# Patient Record
Sex: FEMALE | Race: OTHER | Employment: UNEMPLOYED | URBAN - METROPOLITAN AREA
[De-identification: names, ages, dates, MRNs, and addresses within clinical notes are randomized per-mention and may not be internally consistent; named-entity substitution may affect disease eponyms.]

---

## 2024-03-11 ENCOUNTER — HOSPITAL ENCOUNTER (OUTPATIENT)
Age: 29
Discharge: HOME | End: 2024-03-11
Attending: STUDENT IN AN ORGANIZED HEALTH CARE EDUCATION/TRAINING PROGRAM | Admitting: STUDENT IN AN ORGANIZED HEALTH CARE EDUCATION/TRAINING PROGRAM

## 2024-03-11 ENCOUNTER — APPOINTMENT (OUTPATIENT)
Dept: ULTRASOUND IMAGING | Age: 29
End: 2024-03-11

## 2024-03-11 VITALS
RESPIRATION RATE: 16 BRPM | DIASTOLIC BLOOD PRESSURE: 57 MMHG | SYSTOLIC BLOOD PRESSURE: 118 MMHG | TEMPERATURE: 98.4 F | HEART RATE: 62 BPM | OXYGEN SATURATION: 99 %

## 2024-03-11 PROBLEM — O20.9 VAGINAL BLEEDING BEFORE 22 WEEKS GESTATION: Status: ACTIVE | Noted: 2024-03-11

## 2024-03-11 PROBLEM — O46.92 VAGINAL BLEEDING IN PREGNANCY, SECOND TRIMESTER: Status: ACTIVE | Noted: 2024-03-11

## 2024-03-11 LAB
AMPHETAMINES: NEGATIVE
BACTERIA: NEGATIVE /HPF
BARBITURATE SCREEN URINE: NEGATIVE
BENZODIAZEPINE SCREEN, URINE: NEGATIVE
BILIRUBIN URINE: NEGATIVE MG/DL
BLOOD, URINE: ABNORMAL
CANNABINOID SCREEN URINE: NEGATIVE
CLARITY: CLEAR
COCAINE METABOLITE: NEGATIVE
COLOR: YELLOW
FENTANYL URINE: NEGATIVE
GLUCOSE, URINE: NEGATIVE MG/DL
KETONES, URINE: NEGATIVE MG/DL
LEUKOCYTE ESTERASE, URINE: ABNORMAL
Lab: NORMAL
MUCUS: ABNORMAL HPF
NITRITE URINE, QUANTITATIVE: NEGATIVE
OPIATES, URINE: NEGATIVE
OXYCODONE: NEGATIVE
PH, URINE: 7 (ref 5–8)
PROTEIN UA: NEGATIVE MG/DL
RBC URINE: 0 /HPF (ref 0–6)
SPECIFIC GRAVITY UA: 1.01 (ref 1–1.03)
SPECIMEN: NORMAL
SQUAMOUS EPITHELIAL: 16 /HPF
TRANSITIONAL EPITHELIAL: <1 /HPF
TRICHOMONAS: ABNORMAL /HPF
UROBILINOGEN, URINE: 0.2 MG/DL (ref 0.2–1)
WBC UA: <1 /HPF (ref 0–5)
WET PREP: NORMAL
WET PREP: NORMAL

## 2024-03-11 PROCEDURE — 76805 OB US >/= 14 WKS SNGL FETUS: CPT

## 2024-03-11 PROCEDURE — 99213 OFFICE O/P EST LOW 20 MIN: CPT

## 2024-03-11 PROCEDURE — 80307 DRUG TEST PRSMV CHEM ANLYZR: CPT

## 2024-03-11 PROCEDURE — 87210 SMEAR WET MOUNT SALINE/INK: CPT

## 2024-03-11 PROCEDURE — 93975 VASCULAR STUDY: CPT

## 2024-03-11 PROCEDURE — 81001 URINALYSIS AUTO W/SCOPE: CPT

## 2024-03-11 PROCEDURE — 99213 OFFICE O/P EST LOW 20 MIN: CPT | Performed by: ADVANCED PRACTICE MIDWIFE

## 2024-03-11 RX ORDER — ACETAMINOPHEN 500 MG
1000 TABLET ORAL EVERY 8 HOURS PRN
Status: DISCONTINUED | OUTPATIENT
Start: 2024-03-11 | End: 2024-03-11 | Stop reason: HOSPADM

## 2024-03-11 ASSESSMENT — PAIN DESCRIPTION - ORIENTATION: ORIENTATION: LOWER

## 2024-03-11 ASSESSMENT — PAIN DESCRIPTION - DESCRIPTORS: DESCRIPTORS: CRAMPING

## 2024-03-11 ASSESSMENT — PAIN SCALES - GENERAL: PAINLEVEL_OUTOF10: 4

## 2024-03-11 ASSESSMENT — PAIN DESCRIPTION - LOCATION: LOCATION: ABDOMEN

## 2024-03-11 ASSESSMENT — PAIN - FUNCTIONAL ASSESSMENT: PAIN_FUNCTIONAL_ASSESSMENT: ACTIVITIES ARE NOT PREVENTED

## 2024-03-11 ASSESSMENT — PAIN DESCRIPTION - ONSET: ONSET: ON-GOING

## 2024-03-11 ASSESSMENT — PAIN DESCRIPTION - FREQUENCY: FREQUENCY: INTERMITTENT

## 2024-03-11 ASSESSMENT — PAIN DESCRIPTION - PAIN TYPE: TYPE: ACUTE PAIN

## 2024-03-11 NOTE — PROGRESS NOTES
Department of Obstetrics and Gynecology  Labor and Delivery  TRIAGE NOTE      SUBJECTIVE:    Chief Complaint   Patient presents with    Vaginal Bleeding     Francious 30 yo  at 16.1 wks presents for vaginal bleeding. Was seen in the ED on  for onset of bleeding. Patient reports bleeding has continued since then. Due to language barrier, difficult to assess level of bleeding. Reports bleeding was red and is now brown. Bleeding is \"continuous\" but only scant amount of brown bleeding is noticed on pad. Also reports a new vaginal odor. Denies cramping/contractions. Has felt some fetal movement.     OBJECTIVE    Vitals:  /63   Pulse 77   Temp 97.8 °F (36.6 °C) (Oral)   Resp 16   SpO2 99%       CONSTITUTIONAL:  negative  RESPIRATORY:  negative  CARDIOVASCULAR:  negative  GASTROINTESTINAL:  negative  ALLERGIC/IMMUNOLOGIC:  negative  NEUROLOGICAL:  negative  BEHAVIOR/PSYCH:  negative    SSE: Cervix visually closed. Scant old bleeding noted. Cervical polyps noted at 3 and 9 o'clock. Negative bleeding or pooling with valsalva.     Cervix:             Dilation:     Closed         Effacement:    Long         Consistency:    firm         Position:     posterior      Membranes:    Intact    Fetal heart rate:  145 by doppler       DATA:  Lab Results   Component Value Date    WBC 6.6 2024    HGB 10.0 (L) 2024    HCT 32.5 (L) 2024    MCV 73.4 (L) 2024     2024 11:55   WET PREP, GENITAL Rpt   Wet Prep NO TRICHOMONAS OR YEAST NOTED ON DIRECT WET PREP  NO  CLUE CELLS NOTED     Rpt: View report in Results Review for more information   Latest Reference Range & Units 24 11:40   Color, UA YELLOW  YELLOW   Clarity, UA CLEAR  CLEAR   Bilirubin, Urine NEGATIVE MG/DL NEGATIVE   Ketones, Urine NEGATIVE MG/DL NEGATIVE   Specific Gravity, UA 1.001 - 1.035  1.010   Blood, Urine NEGATIVE  LARGE NUMBER OR AMOUNT OBSERVED !   Protein, UA NEGATIVE MG/DL NEGATIVE

## 2024-03-11 NOTE — FLOWSHEET NOTE
Callie Thomas interpretor used for assessment. Patient ambulatory to the labor and delivery department with complaints of ongoing vaginal bleeding since her visit in the ED on 2/25/24. Despite using interpretor it is unclear if she has been seen at the [facility] since being seen in the ED. It is also unclear on how heavy the bleeding is, she states that the blood is \"always coming\" but also states \"the blood is only when I pee\". She does report that the color and odor of the blood has changed. Only a scant amount of blood is noted on the toilet paper/pad at this time and is brown in color. She has complaints of mild intermittent abdominal cramping.

## 2024-03-11 NOTE — FLOWSHEET NOTE
Discharge instructions given and explained. Instructed to follow up in office and return for increased vaginal bleeding, and instructed on pelvic rest. Pt verbalized understanding and pt left amb from unit for home without distress.

## 2024-03-11 NOTE — DISCHARGE INSTRUCTIONS
rechèch la brandyn w aprann plis francis \"Kontaj Kantite Fwa Tibebe Ou Bouje: Enstwiksyon brandyn Swen.\"  Ivana bird chetan: 10 July 2023               Laurarosalva arnoldjustin: 14.0  © [id number] [employer], [employer].   Adapte anba yon lisans Enstriksyon brandyn swen pwofesyonèl swen sante w la Si w gen kesyon francis yon maladi oswa enstriksyon sa a, toujou mande pwofesyonèl swen sante w la. [employer], [employer] voye jete tout garanti oswa responsablite brandyn itilize ou itilize enfòmasyon sa yo.

## 2024-03-29 LAB
ABO GROUPING: NORMAL
ANTIBODY SCREEN: NEGATIVE
BACTERIA, URINE: ABNORMAL /HPF
BASOPHILS ABSOLUTE: 0 K/UL (ref 0–0.3)
BASOPHILS RELATIVE PERCENT: 0.2 % (ref 0–2)
BILIRUBIN URINE: NEGATIVE MG/DL
BILIRUBIN URINE: NEGATIVE MG/DL
CHLAMYDIA, NUC. ACID AMP: NOT DETECTED
CLARITY: CLEAR
CLARITY: CLEAR
COLOR: YELLOW
COLOR: YELLOW
DIFFERENTIAL COUNT: NORMAL
EOSINOPHILS ABSOLUTE: 0.1 K/UL (ref 0–0.5)
EOSINOPHILS RELATIVE PERCENT: 1.6 % (ref 0–5)
EPITHELIAL CELLS, UA: ABNORMAL /HPF (ref 0–5)
EPITHELIAL CELLS, UA: ABNORMAL /HPF (ref 0–5)
ERYTHROCYTES URINE: ABNORMAL /HPF (ref 0–2)
GLUCOSE URINE: NEGATIVE MG/DL
GLUCOSE URINE: NEGATIVE MG/DL
HB: SOURCE: NORMAL
HCT VFR BLD CALC: 31.7 % (ref 34–49)
HEMOGLOBIN: 9.6 G/DL (ref 11.2–15.7)
HEPATITIS B SURF AG,XHBAGS: NEGATIVE
HEPATITIS C VIRUS RNA SER/PLAS NCNC: NEGATIVE
HIV 1+2 AB+HIV1P24 AG, EIA: NORMAL
IMMATURE GRANS (ABS): 0 K/UL (ref 0–0.1)
IMMATURE GRANULOCYTES: 0.5 %
KETONES, URINE: NEGATIVE MG/DL
KETONES, URINE: NEGATIVE MG/DL
LEUKOCYTE ESTERASE, URINE: ABNORMAL
LEUKOCYTE ESTERASE, URINE: ABNORMAL
LEUKOCYTES, UA: ABNORMAL /HPF (ref 0–5)
LYMPHOCYTES ABSOLUTE: 1.9 K/UL (ref 0.9–4.1)
LYMPHOCYTES RELATIVE PERCENT: 21.7 % (ref 14–51)
MCH RBC QN AUTO: 22.6 PG (ref 26–34)
MCHC RBC AUTO-ENTMCNC: 30.3 G/DL (ref 30.7–35.5)
MCV RBC AUTO: 74.8 FL (ref 80–100)
MONOCYTES ABSOLUTE: 0.6 K/UL (ref 0.2–1)
MONOCYTES RELATIVE PERCENT: 6.8 % (ref 4–12)
MUCUS: PRESENT
N GONORRHOEAE AMPLIFIED DET: NOT DETECTED
NEUTROPHILS ABSOLUTE: 6.1 K/UL (ref 1.8–7.5)
NITRITE, URINE: NEGATIVE
NITRITE, URINE: NEGATIVE
PDW BLD-RTO: 14.5 %
PH, URINE: 6.5 (ref 4.5–8)
PH, URINE: 6.5 (ref 4.5–8)
PLATELET # BLD: 273 K/UL (ref 140–400)
PMV BLD AUTO: 13 FL (ref 7.2–11.7)
PROTEIN, URINE: NEGATIVE MG/DL
PROTEIN, URINE: NEGATIVE MG/DL
RBC # BLD: 4.24 M/UL (ref 3.95–5.26)
RETICULOCYTE ABSOLUTE COUNT: 0 /100 WBC
RH FACTOR: POSITIVE
RPR: NORMAL
RUBELLA IGG QUANTITATION: 2.59 INDEX VALUE
SEGMENTED NEUTROPHILS RELATIVE PERCENT: 69.2 % (ref 42–80)
SPECIFIC GRAVITY UA: 1.02 (ref 1–1.03)
SPECIFIC GRAVITY UA: 1.02 (ref 1–1.03)
URINE HGB: NEGATIVE MG/DL
URINE HGB: NEGATIVE MG/DL
UROBILINOGEN, URINE: <2 MG/DL (ref 0–2)
UROBILINOGEN, URINE: <2 MG/DL (ref 0–2)
WBC # BLD: 8.8 K/UL (ref 3.5–10.9)

## 2024-05-20 ENCOUNTER — HOSPITAL ENCOUNTER (OUTPATIENT)
Dept: GENERAL RADIOLOGY | Age: 29
Discharge: HOME | End: 2024-05-20
Payer: COMMERCIAL

## 2024-05-20 ENCOUNTER — HOSPITAL ENCOUNTER (OUTPATIENT)
Age: 29
Discharge: HOME | End: 2024-05-20
Payer: COMMERCIAL

## 2024-05-20 DIAGNOSIS — Z11.1 SCREENING-PULMONARY TB: ICD-10-CM

## 2024-05-20 PROCEDURE — 71046 X-RAY EXAM CHEST 2 VIEWS: CPT

## 2024-05-31 ENCOUNTER — HOSPITAL ENCOUNTER (OUTPATIENT)
Age: 29
Discharge: HOME | End: 2024-05-31
Attending: STUDENT IN AN ORGANIZED HEALTH CARE EDUCATION/TRAINING PROGRAM | Admitting: STUDENT IN AN ORGANIZED HEALTH CARE EDUCATION/TRAINING PROGRAM
Payer: COMMERCIAL

## 2024-05-31 VITALS
SYSTOLIC BLOOD PRESSURE: 118 MMHG | OXYGEN SATURATION: 100 % | RESPIRATION RATE: 18 BRPM | BODY MASS INDEX: 22.76 KG/M2 | WEIGHT: 145 LBS | HEART RATE: 109 BPM | HEIGHT: 67 IN | TEMPERATURE: 96.4 F | DIASTOLIC BLOOD PRESSURE: 71 MMHG

## 2024-05-31 PROBLEM — O21.9 NAUSEA AND VOMITING DURING PREGNANCY: Status: ACTIVE | Noted: 2024-05-31

## 2024-05-31 LAB
BACTERIA: NEGATIVE /HPF
BILIRUBIN, URINE: NEGATIVE MG/DL
BLOOD, URINE: NEGATIVE
CLARITY: CLEAR
COLOR: YELLOW
GLUCOSE URINE: NEGATIVE MG/DL
KETONES, URINE: NEGATIVE MG/DL
LEUKOCYTE ESTERASE, URINE: ABNORMAL
MUCUS: ABNORMAL HPF
NITRITE URINE, QUANTITATIVE: NEGATIVE
PH, URINE: 7 (ref 5–8)
PROTEIN UA: NEGATIVE MG/DL
RBC URINE: 0 /HPF (ref 0–6)
SPECIFIC GRAVITY UA: 1.01 (ref 1–1.03)
SQUAMOUS EPITHELIAL: 5 /HPF
TRICHOMONAS: ABNORMAL /HPF
UROBILINOGEN, URINE: 0.2 MG/DL (ref 0.2–1)
WBC UA: <1 /HPF (ref 0–5)

## 2024-05-31 PROCEDURE — 99214 OFFICE O/P EST MOD 30 MIN: CPT | Performed by: ADVANCED PRACTICE MIDWIFE

## 2024-05-31 PROCEDURE — 81001 URINALYSIS AUTO W/SCOPE: CPT

## 2024-05-31 PROCEDURE — 99213 OFFICE O/P EST LOW 20 MIN: CPT

## 2024-05-31 PROCEDURE — 6370000000 HC RX 637 (ALT 250 FOR IP): Performed by: ADVANCED PRACTICE MIDWIFE

## 2024-05-31 PROCEDURE — 59025 FETAL NON-STRESS TEST: CPT | Performed by: ADVANCED PRACTICE MIDWIFE

## 2024-05-31 RX ORDER — ONDANSETRON 4 MG/1
4 TABLET, ORALLY DISINTEGRATING ORAL EVERY 8 HOURS PRN
Status: DISCONTINUED | OUTPATIENT
Start: 2024-05-31 | End: 2024-05-31 | Stop reason: HOSPADM

## 2024-05-31 RX ORDER — ONDANSETRON 2 MG/ML
4 INJECTION INTRAMUSCULAR; INTRAVENOUS EVERY 6 HOURS PRN
Status: DISCONTINUED | OUTPATIENT
Start: 2024-05-31 | End: 2024-05-31 | Stop reason: HOSPADM

## 2024-05-31 RX ORDER — ONDANSETRON 4 MG/1
4 TABLET, ORALLY DISINTEGRATING ORAL 3 TIMES DAILY PRN
Qty: 21 TABLET | Refills: 1 | Status: SHIPPED | OUTPATIENT
Start: 2024-05-31

## 2024-05-31 RX ADMIN — ONDANSETRON 4 MG: 4 TABLET, ORALLY DISINTEGRATING ORAL at 15:01

## 2024-05-31 NOTE — FLOWSHEET NOTE
Discharge instructions given and explained. Instructed to follow up in the office as scheduled. And return for decreased FM, leaking fluid, vaginal bleeding, or contractions. Information given on fetal kick counts. PT left amb form unit for home without distress.

## 2024-05-31 NOTE — DISCHARGE INSTRUCTIONS
Counting Your Baby's Kicks: Care Instructions  Overview     Kontaj Kantite Fwa Tibebe Ou Bouje: Enstwiksyon brandyn Swen  Counting Your Baby's Kicks: Care Instructions  Enstriksyon brandyn Swen Ou  Kontaj kantite fwa tibebe ou bouje se yon fason doktè ou kapab di si tibebe ou an sante. Pifò fanm yo-sitou nan yon premye gwosès-marley tibebe ou ap bouje brandyn premye fwa ant 16 ak 22 semèn. Yo ka marley mouvman an tankou batman zèl alaplas koutpye. Tibebe ou ka bouje plis nan sèten moman lajounen an. Lè ou aktif, ou ka remake tibebe ou bouje mwens pase lè ou repoze. Nan vizit anvan akouchman ou, doktè ou ap mande ou si tibebe a aktif.  Nan dènye trimès ou, doktè ou ka mande ou brandyn konte kantite fwa ou marley tibebe ou bouje.  Swen siplemantè se yon alfonso enpòtan nan tretman ak sekirite ou. Sonje brandyn pran tout randevou yo epi brandyn david azeem yo, epitou rele doktè ou si ou gen pwoblèm. Se yon bon lide nirav brandyn konnen rezilta tès ou yo, epitou brandyn konsève yon lis medikaman w ap pran yo.  Kijan brandyn konte kantite fwa tibebe a bouje nan vant ou?  Yon metòd komen brandyn tcheke mouvman tibebe ou se konte kantite koutpye ou marley li bay oswa kantite fwa ou marley li bouje nan 1 èdtan. Li nòmal brandyn marley 10 mouvman (tankou kout-waleska, mouvman martha a k ap vòltije, oswa mouvman martha a k ap woule) nan 1 èdtan. Kèk doktè sijere brandyn konte lematen jouk lè ou rive nan 10 mouvman. Answit ou kapab kite brandyn kevin a epi rekòmanse nan kevin apre a.  Chwazi moman tibebe ou pi aktif nan jounen an brandyn konte. Sa ka nenpòt moman ant lematen ak aswè.  Si ou pa marley 10 mouvman nan inèdtan, tibebe ou ak ap dòmi. Rete tann pwochen èdtan an, epi konte ankò.  Kilè ou ta dwe rele brandyn mande èd?  Rele doktè ou kounye a oswa chèche swen medikal imedyatman si:  Ou te remake tibebe ou te sispann bouje oswa l ap bouje mwens pase nòmal.  Siveye deprè brandyn chanjman nan sante ou, epitou asire ou kontakte doktè ou si ou gen nenpòt pwoblèm.  Ki kote ou kapab aprann plis?  David francis

## 2024-05-31 NOTE — PROGRESS NOTES
Department of Obstetrics and Gynecology  Labor and Delivery  TRIAGE NOTE      SUBJECTIVE:    Chief Complaint   Patient presents with    Abdominal Pain     Edrine is a 30 yo  at 27.5 weeks. She presents to triage with concern for abdominal pain around her umbilicus. She also reports nausea and vomiting since yesterday morning. She reports she did have some nausea earlier in pregnancy but has not had any recently up until yesterday. She denies ctxs, vb, lof, urinary sx.    OBJECTIVE    Vitals:  /71   Pulse (!) 109   Temp (!) 96.4 °F (35.8 °C) (Oral)   Resp 18   Ht 1.702 m (5' 7\")   Wt 65.8 kg (145 lb)   SpO2 100%   BMI 22.71 kg/m²       CONSTITUTIONAL:  negative  RESPIRATORY:  negative  CARDIOVASCULAR:  negative  GASTROINTESTINAL:  negative  ALLERGIC/IMMUNOLOGIC:  negative  NEUROLOGICAL:  negative  BEHAVIOR/PSYCH:  negative    Cervix:  deferred    Fetal Position:    Unsure    Membranes:    Intact    Fetal heart rate:        Baseline FHR :    150 bpm              Fetal Accelerations:  present        Fetal Decelerations:  absent        Fetal Variability:   moderate        NST Time: approx 30 min    Contraction frequency:  None     DATA:  UA pending       ASSESSMENT:       30 yo  at 27.5 weeks   N/v  Abdominal pain  Cat I FHR tracing; reactive NST     PLAN:    Will give zofran for nausea. Sx consistent with GI illness.     Will plan to discharge home if UA WNL and sx improved with zofran.     I have collaborated and updated Dr Roy  and the MD agrees with the current POC.     Carolin Lam, KYLEIGH - EDEL

## 2024-05-31 NOTE — PROGRESS NOTES
Patient arrived ambulatory to Labor & Delivery for triage for umbilical pain that started yesterday.     Patient shown to room and instructed to place on gown and obtain urine specimen. Patient oriented to room and call light.    EFM & toco applied, +FHR. Abdomen soft and non tender to palpation. Patient denies headache, epigastric pain, abdominal pain and contractions. Patient reports feeling her baby move well. Denies vaginal bleeding or leaking of fluid.      Midwife notified of arrival.

## 2024-06-13 ENCOUNTER — APPOINTMENT (OUTPATIENT)
Dept: ULTRASOUND IMAGING | Age: 29
End: 2024-06-13
Payer: COMMERCIAL

## 2024-06-13 ENCOUNTER — HOSPITAL ENCOUNTER (OUTPATIENT)
Age: 29
Discharge: HOME | End: 2024-06-13
Attending: OBSTETRICS & GYNECOLOGY | Admitting: OBSTETRICS & GYNECOLOGY
Payer: COMMERCIAL

## 2024-06-13 VITALS
SYSTOLIC BLOOD PRESSURE: 101 MMHG | BODY MASS INDEX: 24.96 KG/M2 | OXYGEN SATURATION: 98 % | WEIGHT: 159 LBS | HEIGHT: 67 IN | TEMPERATURE: 98.5 F | DIASTOLIC BLOOD PRESSURE: 55 MMHG | HEART RATE: 76 BPM | RESPIRATION RATE: 17 BRPM

## 2024-06-13 LAB
BACTERIA: NEGATIVE /HPF
BILIRUBIN, URINE: NEGATIVE MG/DL
BLOOD, URINE: ABNORMAL
CLARITY: CLEAR
COLOR: YELLOW
GLUCOSE URINE: NEGATIVE MG/DL
KETONES, URINE: NEGATIVE MG/DL
LEUKOCYTE ESTERASE, URINE: NEGATIVE
NITRITE URINE, QUANTITATIVE: NEGATIVE
PH, URINE: 6 (ref 5–8)
PROTEIN UA: NEGATIVE MG/DL
RBC URINE: <1 /HPF (ref 0–6)
SPECIFIC GRAVITY UA: <1.005 (ref 1–1.03)
SQUAMOUS EPITHELIAL: 1 /HPF
TRICHOMONAS: NORMAL /HPF
UROBILINOGEN, URINE: 0.2 MG/DL (ref 0.2–1)
WBC UA: 1 /HPF (ref 0–5)

## 2024-06-13 PROCEDURE — 76815 OB US LIMITED FETUS(S): CPT

## 2024-06-13 PROCEDURE — 81001 URINALYSIS AUTO W/SCOPE: CPT

## 2024-06-13 PROCEDURE — 59025 FETAL NON-STRESS TEST: CPT | Performed by: ADVANCED PRACTICE MIDWIFE

## 2024-06-13 PROCEDURE — 84112 EVAL AMNIOTIC FLUID PROTEIN: CPT

## 2024-06-13 PROCEDURE — 99213 OFFICE O/P EST LOW 20 MIN: CPT

## 2024-06-13 PROCEDURE — 99213 OFFICE O/P EST LOW 20 MIN: CPT | Performed by: ADVANCED PRACTICE MIDWIFE

## 2024-06-13 RX ORDER — ACETAMINOPHEN 500 MG
1000 TABLET ORAL EVERY 8 HOURS PRN
Status: DISCONTINUED | OUTPATIENT
Start: 2024-06-13 | End: 2024-06-13 | Stop reason: HOSPADM

## 2024-06-13 NOTE — FLOWSHEET NOTE
Patient ambulatory to the labor and delivery department. She states that she was sent by her doctor for an ultrasound. She denies any bleeding but states that she began leaking fluid yesterday at around 2pm. Verbalizes normal fetal movement. EFM and TOCO on and tracing, call light within reach.    Callie Thomas interpretor Afshin #[id number] via bedside video Ipad

## 2024-06-13 NOTE — PROGRESS NOTES
ED 11.7. Pt can be discharged home.     Third trimester warning signs reviewed and pt to     KYLEIGH Anderson CNM

## 2024-06-13 NOTE — PROGRESS NOTES
Department of Obstetrics and Gynecology  Labor and Delivery  TRIAGE NOTE      SUBJECTIVE:    Chief Complaint   Patient presents with    Pregnancy Problem     Sent by OB for an ultrasound     Edrine is a 30 yo  at 29.4 weeks. She presents to triage from the [facility] with concern for LOF. She reports noticing a small amount of leaking fluid yesterday. She denies vaginal bleeding, regular ctxs. She denies urinary sx.     OBJECTIVE    Vitals:  /62   Pulse 82   Temp 98 °F (36.7 °C) (Oral)   Resp 18   Ht 1.702 m (5' 7.01\")   Wt 72.1 kg (159 lb)   SpO2 100%   BMI 24.90 kg/m²       CONSTITUTIONAL:  negative  RESPIRATORY:  negative  CARDIOVASCULAR:  negative  GASTROINTESTINAL:  negative  ALLERGIC/IMMUNOLOGIC:  negative  NEUROLOGICAL:  negative  BEHAVIOR/PSYCH:  negative    SSE: neg for lof/bleeding/leaking with valsalva. Small cervical polyps noted at 2 and 8 o'clock. Cervix visually long/closed    Fetal Position:    Unsure     Membranes:    Amnisure neg, neg for pooling, u/s pending     Fetal heart rate:        Baseline FHR :    140 bpm              Fetal Accelerations:  present        Fetal Decelerations:  absent        Fetal Variability:   moderate        NST Time: approx 30 min    Contraction frequency:  None     DATA:  Amnisure neg    U/s pending     ASSESSMENT:       30 yo  at 29.4 weeks   Concern for LOF   Cat I FHR tracing; reactive NST     PLAN:    Will discharge home if ED WNL.       KYLEIGH Anderson CNM

## 2024-06-13 NOTE — FLOWSHEET NOTE
This nurse at bedside with Carolin HOLLINGSWORTH. Amnisure test completed and pending. Speculum exam performed by Carolin HOLLINGSWORTH with patient consent. Patient tolerated exam well.

## 2024-08-08 ENCOUNTER — HOSPITAL ENCOUNTER (OUTPATIENT)
Age: 29
Discharge: HOME | End: 2024-08-08
Attending: OBSTETRICS & GYNECOLOGY | Admitting: OBSTETRICS & GYNECOLOGY
Payer: COMMERCIAL

## 2024-08-08 VITALS
RESPIRATION RATE: 16 BRPM | DIASTOLIC BLOOD PRESSURE: 55 MMHG | SYSTOLIC BLOOD PRESSURE: 103 MMHG | OXYGEN SATURATION: 98 % | TEMPERATURE: 98.4 F | HEART RATE: 83 BPM

## 2024-08-08 LAB
BACTERIA: ABNORMAL /HPF
BILIRUBIN, URINE: NEGATIVE MG/DL
BLOOD, URINE: NEGATIVE
CLARITY, UA: CLEAR
COLOR, UA: YELLOW
GLUCOSE URINE: NEGATIVE MG/DL
KETONES, URINE: NEGATIVE MG/DL
LEUKOCYTE ESTERASE, URINE: ABNORMAL
NITRITE URINE, QUANTITATIVE: NEGATIVE
PH, URINE: 7 (ref 5–8)
PROTEIN UA: NEGATIVE MG/DL
RBC URINE: 1 /HPF (ref 0–6)
SPECIFIC GRAVITY UA: 1.01 (ref 1–1.03)
SQUAMOUS EPITHELIAL: 11 /HPF
TRICHOMONAS: ABNORMAL /HPF
UROBILINOGEN, URINE: 0.2 MG/DL (ref 0.2–1)
WBC UA: <1 /HPF (ref 0–5)

## 2024-08-08 PROCEDURE — 99213 OFFICE O/P EST LOW 20 MIN: CPT

## 2024-08-08 PROCEDURE — 59025 FETAL NON-STRESS TEST: CPT | Performed by: ADVANCED PRACTICE MIDWIFE

## 2024-08-08 PROCEDURE — 99213 OFFICE O/P EST LOW 20 MIN: CPT | Performed by: ADVANCED PRACTICE MIDWIFE

## 2024-08-08 PROCEDURE — 87086 URINE CULTURE/COLONY COUNT: CPT

## 2024-08-08 PROCEDURE — 81001 URINALYSIS AUTO W/SCOPE: CPT

## 2024-08-08 NOTE — FLOWSHEET NOTE
EFM off discharge instructions given and explained. Instructed to follow up in office as scheduled. Return for decreased FM, leaking fluid, or vaginal bleeding. Pt verbalized understanding and left amb from unit for home without distress.

## 2024-08-08 NOTE — FLOWSHEET NOTE
Pt presents amb to unit for difficulty urinating and \"lump\" on left side of vagina. States feels like needs to urinate but can't. Denies vaginal bleeding or leaking fluid. Abd soft and non tender. FM audible. POC discussed. Carolin HOLLINGSWORTH notified of pt arrival. Interpretor services used.

## 2024-08-08 NOTE — PROGRESS NOTES
Department of Obstetrics and Gynecology  Labor and Delivery  TRIAGE NOTE      SUBJECTIVE:  No chief complaint on file.    Jordy is a 30 yo  at 37.4 weeks. She presents to triage with concern for a bump on her vagina as well as difficulty urinating. She reports she feels like she has to urinate but has difficulty initiating a urine stream. She denies hematuria, dysuria. She denies ctxs, lof, vb.     OBJECTIVE    Vitals:  /65   Pulse (!) 120   Temp 98.4 °F (36.9 °C) (Temporal)   Resp 18   SpO2 98%       CONSTITUTIONAL:  negative  RESPIRATORY:  negative  CARDIOVASCULAR:  negative  GASTROINTESTINAL:  negative  : Small 5emo7ss ingrown hair noted to L labia; no s/s of infection, non-purulent.   ALLERGIC/IMMUNOLOGIC:  negative  NEUROLOGICAL:  negative  BEHAVIOR/PSYCH:  negative    Cervix:  deferred    Fetal Position:    Cephalic    Membranes:    Intact    Fetal heart rate:        Baseline FHR :    130 bpm              Fetal Accelerations:  present        Fetal Decelerations:  absent        Fetal Variability:   moderate        NST Time: approx 60 min    Contraction frequency:  Irregular, rare    DATA:   Latest Reference Range & Units 24 13:10   Glucose, Ur NEGATIVE MG/DL NEGATIVE   Bilirubin, Urine NEGATIVE MG/DL NEGATIVE   Ketones, Urine NEGATIVE MG/DL NEGATIVE   Specific Gravity, UA 1.001 - 1.035  1.010   Blood, Urine NEGATIVE  NEGATIVE   Protein, UA NEGATIVE MG/DL NEGATIVE   Urobilinogen 0.2 - 1.0 MG/DL 0.2   Leukocyte Esterase, Urine NEGATIVE  TRACE !   Nitrite Urine, Quantitative NEGATIVE  NEGATIVE   pH, Urine 5.0 - 8.0  7.0   WBC, UA 0 - 5 /HPF <1   RBC, UA 0 - 6 /HPF 1   Squam Epithel, UA /HPF 11   Bacteria, UA NEGATIVE /HPF OCCASIONAL !   Trichomonas NONE SEEN /HPF NONE SEEN   !: Data is abnormal    ASSESSMENT:       30 yo  at 37.4 weeks  Difficulty initiating urine stream  UA w/o s/s of UTI   Folliculitis    PLAN:    Will add on urine cultute to r/o UTI even though UA w/o s/s of  UTI.     Encouraged epsom salt soak and warm compresses for folliculitis.     Will discharge home with third trimester precautions.     KYLEIGH Anderson CNM

## 2024-08-08 NOTE — DISCHARGE INSTRUCTIONS
Kontaj Kantite Fwa Tibebe Ou Bouje: Enstwiksyon brandyn Swen  Counting Your Baby's Kicks: Care Instructions  Enstriksyon brandyn Swen Ou  Kontaj kantite fwa tibebe ou bouje se yon fason doktè ou kapab di si tibebe ou an sante. Pifò fanm yo-sitou nan yon premye gwosès-marley tibebe ou ap bouje brandyn premye fwa ant 16 ak 22 semèn. Yo ka marley mouvman an tankou batman zèl alaplas koutpye. Tibebe ou ka bouje plis nan sèten moman lajounen an. Lè ou aktif, ou ka remake tibebe ou bouje mwens pase lè ou repoze. Nan vizit anvan akouchman ou, doktè ou ap mande ou si tibebe a aktif.  Nan dènye trimès ou, doktè ou ka mande ou brandyn konte kantite fwa ou marley tibebe ou bouje.  Swen siplemantè se yon alfonso enpòtan nan tretman ak sekirite ou. Sonje brandyn pran tout randevou yo epi brandyn david azeem yo, epitou rele doktè ou si ou gen pwoblèm. Se yon bon lide nirav brandyn konnen rezilta tès ou yo, epitou brandyn konsève yon lis medikaman w ap pran yo.  Kijan brandyn konte kantite fwa tibebe a bouje nan vant ou?  Yon metòd komen brandyn tcheke mouvman tibebe ou se konte kantite koutpye ou marley li bay oswa kantite fwa ou marley li bouje nan 1 èdtan. Li nòmal brandyn marley 10 mouvman (tankou kout-waleska, mouvman martha a k ap vòltije, oswa mouvman martha a k ap woule) nan 1 èdtan. Kèk doktè sijere brandyn konte lematen jouk lè ou rive nan 10 mouvman. Answit ou kapab kite brandyn kevin a epi rekòmanse nan kevin apre a.  Chwazi moman tibebe ou pi aktif nan jounen an brandyn konte. Sa ka nenpòt moman ant lematen ak aswè.  Si ou pa marley 10 mouvman nan inèdtan, tibebe ou ak ap dòmi. Rete tann pwochen èdtan an, epi konte ankò.  Kilè ou ta dwe rele brandyn mande èd?  Rele doktè ou kounye a oswa chèche swen medikal imedyatman si:  Ou te remake tibebe ou te sispann bouje oswa l ap bouje mwens pase nòmal.  Siveye deprè brandyn chanjman nan sante ou, epitou asire ou kontakte doktè ou si ou gen nenpòt pwoblèm.  Ki kote ou kapab aprann plis?  David francis   [email or web address]  Antre [id number] nan bwat

## 2024-08-09 LAB
CULTURE: NORMAL
Lab: NORMAL
SPECIMEN: NORMAL

## 2024-08-21 ENCOUNTER — HOSPITAL ENCOUNTER (OUTPATIENT)
Age: 29
Discharge: HOME | End: 2024-08-21
Attending: OBSTETRICS & GYNECOLOGY | Admitting: OBSTETRICS & GYNECOLOGY
Payer: COMMERCIAL

## 2024-08-21 VITALS
HEIGHT: 67 IN | SYSTOLIC BLOOD PRESSURE: 106 MMHG | RESPIRATION RATE: 18 BRPM | TEMPERATURE: 98 F | BODY MASS INDEX: 25.11 KG/M2 | HEART RATE: 82 BPM | OXYGEN SATURATION: 100 % | WEIGHT: 160 LBS | DIASTOLIC BLOOD PRESSURE: 66 MMHG

## 2024-08-21 LAB
BACTERIA: ABNORMAL /HPF
BILIRUBIN, URINE: NEGATIVE MG/DL
BLOOD, URINE: NEGATIVE
CLARITY, UA: CLEAR
COLOR, UA: YELLOW
GLUCOSE URINE: NEGATIVE MG/DL
KETONES, URINE: NEGATIVE MG/DL
LEUKOCYTE ESTERASE, URINE: ABNORMAL
MUCUS: ABNORMAL HPF
NITRITE URINE, QUANTITATIVE: NEGATIVE
PH, URINE: 6 (ref 5–8)
PROTEIN UA: NEGATIVE MG/DL
RBC URINE: 2 /HPF (ref 0–6)
SPECIFIC GRAVITY UA: 1.01 (ref 1–1.03)
SQUAMOUS EPITHELIAL: 16 /HPF
TRICHOMONAS: ABNORMAL /HPF
UROBILINOGEN, URINE: 0.2 MG/DL (ref 0.2–1)
WBC UA: 3 /HPF (ref 0–5)
YEAST: ABNORMAL /HPF

## 2024-08-21 PROCEDURE — 81001 URINALYSIS AUTO W/SCOPE: CPT

## 2024-08-21 PROCEDURE — 99213 OFFICE O/P EST LOW 20 MIN: CPT

## 2024-08-21 PROCEDURE — 80307 DRUG TEST PRSMV CHEM ANLYZR: CPT

## 2024-08-22 NOTE — FLOWSHEET NOTE
2013- EFM and [place] placed on patient; she reports feeling vaginal pressure like she needs to have BM, with occasional ctx's; denies any vaginal bleeding or leaking of fluid; reports positive fetal movement, abdomen soft and non-tender upon palpation.     2015- Vital signs taken; SVE with 1cm/thick.

## 2024-08-22 NOTE — FLOWSHEET NOTE
Patient arrived ambulatory to BC with c/o vaginal pressure; taken to LT-05. Interpretor    # [id number] used for Fijian Creole language.

## 2024-08-22 NOTE — DISCHARGE INSTRUCTIONS
LABOR    Call your doctor if you have these signs:     Regular tightening of the uterus coming as often as once every 15 minutes     Menstrual-like cramps, they may be regular, or may be continuous and move to   your back.     A low, dull backache different from what you normally experience. It may come and go.    Vaginal leaking of fluid.     Any bleeding from your vagina.    Pain, burning or bleeding when you urinate.      LABOR    Call your doctor, or come to the hospital, if you have:    Contractions coming about every 3-5 minutes apart, for about one hour. Labor contractions are usually strong enough that you can not walk or talk while having the contractions. ( Contractions can feel like menstrual cramps, tightening in your abdomen, rectal pressure or a backache. This feeling comes and goes.)    Vaginal leaking of fluid.    Heavy, bright red bleeding, like the days of your period. ( A little bloody show or spotting is normal in labor.)    ALWAYS CALL YOUR DOCTOR IF YOU:    Notice decreased movement of your baby, different from what you are used to.    Have heavy, bright red bleeding, like the heavy days of your periods.    Have vaginal leaking of fluid.    Keep your next appointment in the birthing center as needed.     Activity as tolerated    Diet as tolerated

## 2024-08-22 NOTE — FLOWSHEET NOTE
Discharge paperwork given and explained to patient via interpretor #[id number]; all questions answered; to always return with any increased pain and ctx's, vaginal bleeding or leaking, or decreased fetal movement; she verbalized understanding; patient ambulated out of BC upon discharge, no distress noted.

## 2024-08-22 NOTE — FLOWSHEET NOTE
TR to Dr. Donahue with patient arrival, MILLIE, OB hx, EFM and [place] tracings reviewed, 1cm/thick; U/A sent, orders received to re-check cervix in couple hours and if no change made, ok to discharge.

## 2024-08-26 ENCOUNTER — HOSPITAL ENCOUNTER (INPATIENT)
Age: 29
LOS: 3 days | Discharge: HOME | End: 2024-08-29
Attending: OBSTETRICS & GYNECOLOGY | Admitting: STUDENT IN AN ORGANIZED HEALTH CARE EDUCATION/TRAINING PROGRAM
Payer: COMMERCIAL

## 2024-08-26 ENCOUNTER — ANESTHESIA (OUTPATIENT)
Dept: LABOR AND DELIVERY | Age: 29
End: 2024-08-26
Payer: COMMERCIAL

## 2024-08-26 ENCOUNTER — ANESTHESIA EVENT (OUTPATIENT)
Dept: LABOR AND DELIVERY | Age: 29
End: 2024-08-26
Payer: COMMERCIAL

## 2024-08-26 PROBLEM — Z3A.40 40 WEEKS GESTATION OF PREGNANCY: Status: ACTIVE | Noted: 2024-08-26

## 2024-08-26 PROBLEM — O36.63X0: Status: ACTIVE | Noted: 2024-08-26

## 2024-08-26 PROBLEM — Z34.90 ENCOUNTER FOR ELECTIVE INDUCTION OF LABOR: Status: ACTIVE | Noted: 2024-08-26

## 2024-08-26 PROBLEM — D25.9 UTERINE LEIOMYOMA: Status: ACTIVE | Noted: 2024-08-26

## 2024-08-26 PROBLEM — O09.30 LATE PRENATAL CARE: Status: ACTIVE | Noted: 2024-08-26

## 2024-08-26 LAB
ABO/RH: NORMAL
ANTIBODY SCREEN: NEGATIVE
HCT VFR BLD CALC: 29.5 % (ref 37–47)
HEMOGLOBIN: 9.2 GM/DL (ref 12.5–16)
MCH RBC QN AUTO: 22.7 PG (ref 27–31)
MCHC RBC AUTO-ENTMCNC: 31.2 % (ref 32–36)
MCV RBC AUTO: 72.7 FL (ref 78–100)
PDW BLD-RTO: 14.8 % (ref 11.7–14.9)
PLATELET # BLD: 183 K/CU MM (ref 140–440)
RBC # BLD: 4.06 M/CU MM (ref 4.2–5.4)
T. PALLIDUM SCREEN: NEGATIVE
WBC # BLD: 5.3 K/CU MM (ref 4–10.5)

## 2024-08-26 PROCEDURE — 3700000001 HC ADD 15 MINUTES (ANESTHESIA): Performed by: STUDENT IN AN ORGANIZED HEALTH CARE EDUCATION/TRAINING PROGRAM

## 2024-08-26 PROCEDURE — 85027 COMPLETE CBC AUTOMATED: CPT

## 2024-08-26 PROCEDURE — 6360000002 HC RX W HCPCS: Performed by: ADVANCED PRACTICE MIDWIFE

## 2024-08-26 PROCEDURE — 6360000002 HC RX W HCPCS: Performed by: NURSE ANESTHETIST, CERTIFIED REGISTERED

## 2024-08-26 PROCEDURE — 59514 CESAREAN DELIVERY ONLY: CPT | Performed by: ADVANCED PRACTICE MIDWIFE

## 2024-08-26 PROCEDURE — 6370000000 HC RX 637 (ALT 250 FOR IP): Performed by: ADVANCED PRACTICE MIDWIFE

## 2024-08-26 PROCEDURE — 88342 IMHCHEM/IMCYTCHM 1ST ANTB: CPT | Performed by: PATHOLOGY

## 2024-08-26 PROCEDURE — 2709999900 HC NON-CHARGEABLE SUPPLY: Performed by: STUDENT IN AN ORGANIZED HEALTH CARE EDUCATION/TRAINING PROGRAM

## 2024-08-26 PROCEDURE — APPNB30 APP NON BILLABLE TIME 0-30 MINS: Performed by: ADVANCED PRACTICE MIDWIFE

## 2024-08-26 PROCEDURE — 3609079900 HC CESAREAN SECTION: Performed by: STUDENT IN AN ORGANIZED HEALTH CARE EDUCATION/TRAINING PROGRAM

## 2024-08-26 PROCEDURE — 88307 TISSUE EXAM BY PATHOLOGIST: CPT | Performed by: PATHOLOGY

## 2024-08-26 PROCEDURE — 86780 TREPONEMA PALLIDUM: CPT

## 2024-08-26 PROCEDURE — 7100000001 HC PACU RECOVERY - ADDTL 15 MIN: Performed by: STUDENT IN AN ORGANIZED HEALTH CARE EDUCATION/TRAINING PROGRAM

## 2024-08-26 PROCEDURE — 1220000000 HC SEMI PRIVATE OB R&B

## 2024-08-26 PROCEDURE — 86900 BLOOD TYPING SEROLOGIC ABO: CPT

## 2024-08-26 PROCEDURE — 59514 CESAREAN DELIVERY ONLY: CPT | Performed by: STUDENT IN AN ORGANIZED HEALTH CARE EDUCATION/TRAINING PROGRAM

## 2024-08-26 PROCEDURE — 7100000000 HC PACU RECOVERY - FIRST 15 MIN: Performed by: STUDENT IN AN ORGANIZED HEALTH CARE EDUCATION/TRAINING PROGRAM

## 2024-08-26 PROCEDURE — 6360000002 HC RX W HCPCS: Performed by: ANESTHESIOLOGY

## 2024-08-26 PROCEDURE — 86850 RBC ANTIBODY SCREEN: CPT

## 2024-08-26 PROCEDURE — 6360000002 HC RX W HCPCS: Performed by: STUDENT IN AN ORGANIZED HEALTH CARE EDUCATION/TRAINING PROGRAM

## 2024-08-26 PROCEDURE — 3700000000 HC ANESTHESIA ATTENDED CARE: Performed by: STUDENT IN AN ORGANIZED HEALTH CARE EDUCATION/TRAINING PROGRAM

## 2024-08-26 PROCEDURE — 2580000003 HC RX 258: Performed by: ADVANCED PRACTICE MIDWIFE

## 2024-08-26 PROCEDURE — 2500000003 HC RX 250 WO HCPCS: Performed by: ADVANCED PRACTICE MIDWIFE

## 2024-08-26 PROCEDURE — 86901 BLOOD TYPING SEROLOGIC RH(D): CPT

## 2024-08-26 RX ORDER — CITRIC ACID/SODIUM CITRATE 334-500MG
30 SOLUTION, ORAL ORAL ONCE
Status: COMPLETED | OUTPATIENT
Start: 2024-08-26 | End: 2024-08-26

## 2024-08-26 RX ORDER — TERBUTALINE SULFATE 1 MG/ML
0.25 INJECTION, SOLUTION SUBCUTANEOUS
Status: DISCONTINUED | OUTPATIENT
Start: 2024-08-26 | End: 2024-08-26

## 2024-08-26 RX ORDER — MORPHINE SULFATE 0.5 MG/ML
INJECTION, SOLUTION EPIDURAL; INTRATHECAL; INTRAVENOUS PRN
Status: DISCONTINUED | OUTPATIENT
Start: 2024-08-26 | End: 2024-08-26 | Stop reason: SDUPTHER

## 2024-08-26 RX ORDER — TRANEXAMIC ACID 10 MG/ML
1000 INJECTION, SOLUTION INTRAVENOUS
Status: DISCONTINUED | OUTPATIENT
Start: 2024-08-26 | End: 2024-08-26

## 2024-08-26 RX ORDER — SODIUM CHLORIDE, SODIUM LACTATE, POTASSIUM CHLORIDE, CALCIUM CHLORIDE 600; 310; 30; 20 MG/100ML; MG/100ML; MG/100ML; MG/100ML
INJECTION, SOLUTION INTRAVENOUS CONTINUOUS
Status: DISCONTINUED | OUTPATIENT
Start: 2024-08-26 | End: 2024-08-26

## 2024-08-26 RX ORDER — SODIUM CHLORIDE 9 MG/ML
25 INJECTION, SOLUTION INTRAVENOUS PRN
Status: DISCONTINUED | OUTPATIENT
Start: 2024-08-26 | End: 2024-08-26

## 2024-08-26 RX ORDER — IBUPROFEN 800 MG/1
800 TABLET, FILM COATED ORAL EVERY 8 HOURS
Status: DISCONTINUED | OUTPATIENT
Start: 2024-08-27 | End: 2024-08-29 | Stop reason: HOSPADM

## 2024-08-26 RX ORDER — SODIUM CHLORIDE 9 MG/ML
INJECTION, SOLUTION INTRAVENOUS PRN
Status: DISCONTINUED | OUTPATIENT
Start: 2024-08-26 | End: 2024-08-29 | Stop reason: HOSPADM

## 2024-08-26 RX ORDER — DEXAMETHASONE SODIUM PHOSPHATE 4 MG/ML
INJECTION, SOLUTION INTRA-ARTICULAR; INTRALESIONAL; INTRAMUSCULAR; INTRAVENOUS; SOFT TISSUE PRN
Status: DISCONTINUED | OUTPATIENT
Start: 2024-08-26 | End: 2024-08-26 | Stop reason: SDUPTHER

## 2024-08-26 RX ORDER — SODIUM CHLORIDE 0.9 % (FLUSH) 0.9 %
5-40 SYRINGE (ML) INJECTION PRN
Status: DISCONTINUED | OUTPATIENT
Start: 2024-08-26 | End: 2024-08-29 | Stop reason: HOSPADM

## 2024-08-26 RX ORDER — KETOROLAC TROMETHAMINE 30 MG/ML
INJECTION, SOLUTION INTRAMUSCULAR; INTRAVENOUS PRN
Status: DISCONTINUED | OUTPATIENT
Start: 2024-08-26 | End: 2024-08-26 | Stop reason: SDUPTHER

## 2024-08-26 RX ORDER — SODIUM CHLORIDE, SODIUM LACTATE, POTASSIUM CHLORIDE, AND CALCIUM CHLORIDE .6; .31; .03; .02 G/100ML; G/100ML; G/100ML; G/100ML
1000 INJECTION, SOLUTION INTRAVENOUS PRN
Status: DISCONTINUED | OUTPATIENT
Start: 2024-08-26 | End: 2024-08-26

## 2024-08-26 RX ORDER — SODIUM CHLORIDE 0.9 % (FLUSH) 0.9 %
5-40 SYRINGE (ML) INJECTION EVERY 12 HOURS SCHEDULED
Status: DISCONTINUED | OUTPATIENT
Start: 2024-08-26 | End: 2024-08-29 | Stop reason: HOSPADM

## 2024-08-26 RX ORDER — ACETAMINOPHEN 325 MG/1
975 TABLET ORAL ONCE
Status: COMPLETED | OUTPATIENT
Start: 2024-08-26 | End: 2024-08-26

## 2024-08-26 RX ORDER — FAMOTIDINE 20 MG/1
20 TABLET, FILM COATED ORAL 2 TIMES DAILY
Status: DISCONTINUED | OUTPATIENT
Start: 2024-08-26 | End: 2024-08-29 | Stop reason: HOSPADM

## 2024-08-26 RX ORDER — SODIUM CHLORIDE 0.9 % (FLUSH) 0.9 %
5-40 SYRINGE (ML) INJECTION EVERY 12 HOURS SCHEDULED
Status: DISCONTINUED | OUTPATIENT
Start: 2024-08-26 | End: 2024-08-26

## 2024-08-26 RX ORDER — OXYCODONE HYDROCHLORIDE 5 MG/1
10 TABLET ORAL EVERY 4 HOURS PRN
Status: DISCONTINUED | OUTPATIENT
Start: 2024-08-26 | End: 2024-08-29 | Stop reason: HOSPADM

## 2024-08-26 RX ORDER — DIPHENHYDRAMINE HYDROCHLORIDE 50 MG/ML
25 INJECTION INTRAMUSCULAR; INTRAVENOUS EVERY 6 HOURS PRN
Status: DISCONTINUED | OUTPATIENT
Start: 2024-08-26 | End: 2024-08-29 | Stop reason: HOSPADM

## 2024-08-26 RX ORDER — SODIUM CHLORIDE, SODIUM LACTATE, POTASSIUM CHLORIDE, AND CALCIUM CHLORIDE .6; .31; .03; .02 G/100ML; G/100ML; G/100ML; G/100ML
500 INJECTION, SOLUTION INTRAVENOUS PRN
Status: DISCONTINUED | OUTPATIENT
Start: 2024-08-26 | End: 2024-08-26

## 2024-08-26 RX ORDER — DIPHENHYDRAMINE HCL 25 MG
25 TABLET ORAL EVERY 6 HOURS PRN
Status: DISCONTINUED | OUTPATIENT
Start: 2024-08-26 | End: 2024-08-29 | Stop reason: HOSPADM

## 2024-08-26 RX ORDER — ACETAMINOPHEN 500 MG
1000 TABLET ORAL EVERY 8 HOURS SCHEDULED
Status: DISCONTINUED | OUTPATIENT
Start: 2024-08-26 | End: 2024-08-29 | Stop reason: HOSPADM

## 2024-08-26 RX ORDER — GLYCOPYRROLATE 0.2 MG/ML
INJECTION INTRAMUSCULAR; INTRAVENOUS PRN
Status: DISCONTINUED | OUTPATIENT
Start: 2024-08-26 | End: 2024-08-26 | Stop reason: SDUPTHER

## 2024-08-26 RX ORDER — CARBOPROST TROMETHAMINE 250 UG/ML
250 INJECTION, SOLUTION INTRAMUSCULAR PRN
Status: DISCONTINUED | OUTPATIENT
Start: 2024-08-26 | End: 2024-08-26

## 2024-08-26 RX ORDER — ONDANSETRON 2 MG/ML
4 INJECTION INTRAMUSCULAR; INTRAVENOUS EVERY 6 HOURS PRN
Status: DISCONTINUED | OUTPATIENT
Start: 2024-08-26 | End: 2024-08-29 | Stop reason: HOSPADM

## 2024-08-26 RX ORDER — FAMOTIDINE 10 MG/ML
20 INJECTION, SOLUTION INTRAVENOUS 2 TIMES DAILY PRN
Status: DISCONTINUED | OUTPATIENT
Start: 2024-08-26 | End: 2024-08-26 | Stop reason: HOSPADM

## 2024-08-26 RX ORDER — MISOPROSTOL 200 UG/1
400 TABLET ORAL PRN
Status: DISCONTINUED | OUTPATIENT
Start: 2024-08-26 | End: 2024-08-26

## 2024-08-26 RX ORDER — DOCUSATE SODIUM 100 MG/1
100 CAPSULE, LIQUID FILLED ORAL 2 TIMES DAILY
Status: DISCONTINUED | OUTPATIENT
Start: 2024-08-26 | End: 2024-08-29 | Stop reason: HOSPADM

## 2024-08-26 RX ORDER — BUPIVACAINE HYDROCHLORIDE 7.5 MG/ML
INJECTION, SOLUTION INTRASPINAL
Status: COMPLETED | OUTPATIENT
Start: 2024-08-26 | End: 2024-08-26

## 2024-08-26 RX ORDER — ONDANSETRON 2 MG/ML
4 INJECTION INTRAMUSCULAR; INTRAVENOUS EVERY 6 HOURS PRN
Status: DISCONTINUED | OUTPATIENT
Start: 2024-08-26 | End: 2024-08-26

## 2024-08-26 RX ORDER — FENTANYL CITRATE 50 UG/ML
100 INJECTION, SOLUTION INTRAMUSCULAR; INTRAVENOUS
Status: DISCONTINUED | OUTPATIENT
Start: 2024-08-26 | End: 2024-08-26 | Stop reason: HOSPADM

## 2024-08-26 RX ORDER — LANOLIN 72 %
OINTMENT (GRAM) TOPICAL
Status: DISCONTINUED | OUTPATIENT
Start: 2024-08-26 | End: 2024-08-29 | Stop reason: HOSPADM

## 2024-08-26 RX ORDER — SODIUM CHLORIDE, SODIUM LACTATE, POTASSIUM CHLORIDE, AND CALCIUM CHLORIDE .6; .31; .03; .02 G/100ML; G/100ML; G/100ML; G/100ML
1000 INJECTION, SOLUTION INTRAVENOUS ONCE
Status: DISCONTINUED | OUTPATIENT
Start: 2024-08-26 | End: 2024-08-26

## 2024-08-26 RX ORDER — ONDANSETRON 4 MG/1
4 TABLET, ORALLY DISINTEGRATING ORAL EVERY 8 HOURS PRN
Status: DISCONTINUED | OUTPATIENT
Start: 2024-08-26 | End: 2024-08-29 | Stop reason: HOSPADM

## 2024-08-26 RX ORDER — FAMOTIDINE 10 MG/ML
20 INJECTION, SOLUTION INTRAVENOUS ONCE
Status: COMPLETED | OUTPATIENT
Start: 2024-08-26 | End: 2024-08-26

## 2024-08-26 RX ORDER — OXYCODONE HYDROCHLORIDE 5 MG/1
5 TABLET ORAL EVERY 4 HOURS PRN
Status: DISCONTINUED | OUTPATIENT
Start: 2024-08-26 | End: 2024-08-29 | Stop reason: HOSPADM

## 2024-08-26 RX ORDER — LIDOCAINE HYDROCHLORIDE 10 MG/ML
30 INJECTION, SOLUTION EPIDURAL; INFILTRATION; INTRACAUDAL; PERINEURAL PRN
Status: DISCONTINUED | OUTPATIENT
Start: 2024-08-26 | End: 2024-08-26

## 2024-08-26 RX ORDER — KETOROLAC TROMETHAMINE 30 MG/ML
30 INJECTION, SOLUTION INTRAMUSCULAR; INTRAVENOUS EVERY 6 HOURS
Status: DISPENSED | OUTPATIENT
Start: 2024-08-26 | End: 2024-08-27

## 2024-08-26 RX ORDER — SODIUM CHLORIDE 0.9 % (FLUSH) 0.9 %
5-40 SYRINGE (ML) INJECTION PRN
Status: DISCONTINUED | OUTPATIENT
Start: 2024-08-26 | End: 2024-08-26

## 2024-08-26 RX ORDER — SODIUM CHLORIDE 0.9 % (FLUSH) 0.9 %
10 SYRINGE (ML) INJECTION EVERY 12 HOURS SCHEDULED
Status: DISCONTINUED | OUTPATIENT
Start: 2024-08-26 | End: 2024-08-26

## 2024-08-26 RX ORDER — PHENYLEPHRINE HCL IN 0.9% NACL 1 MG/10 ML
SYRINGE (ML) INTRAVENOUS PRN
Status: DISCONTINUED | OUTPATIENT
Start: 2024-08-26 | End: 2024-08-26 | Stop reason: SDUPTHER

## 2024-08-26 RX ORDER — ONDANSETRON 4 MG/1
4 TABLET, ORALLY DISINTEGRATING ORAL EVERY 6 HOURS PRN
Status: DISCONTINUED | OUTPATIENT
Start: 2024-08-26 | End: 2024-08-26

## 2024-08-26 RX ORDER — SODIUM CHLORIDE 0.9 % (FLUSH) 0.9 %
10 SYRINGE (ML) INJECTION PRN
Status: DISCONTINUED | OUTPATIENT
Start: 2024-08-26 | End: 2024-08-26

## 2024-08-26 RX ORDER — FENTANYL CITRATE 50 UG/ML
INJECTION, SOLUTION INTRAMUSCULAR; INTRAVENOUS PRN
Status: DISCONTINUED | OUTPATIENT
Start: 2024-08-26 | End: 2024-08-26 | Stop reason: SDUPTHER

## 2024-08-26 RX ORDER — SODIUM CHLORIDE 9 MG/ML
INJECTION, SOLUTION INTRAVENOUS PRN
Status: DISCONTINUED | OUTPATIENT
Start: 2024-08-26 | End: 2024-08-26

## 2024-08-26 RX ORDER — METHYLERGONOVINE MALEATE 0.2 MG/ML
200 INJECTION INTRAVENOUS PRN
Status: DISCONTINUED | OUTPATIENT
Start: 2024-08-26 | End: 2024-08-26

## 2024-08-26 RX ORDER — ONDANSETRON 2 MG/ML
4 INJECTION INTRAMUSCULAR; INTRAVENOUS EVERY 6 HOURS PRN
Status: DISCONTINUED | OUTPATIENT
Start: 2024-08-26 | End: 2024-08-26 | Stop reason: HOSPADM

## 2024-08-26 RX ADMIN — BUPIVACAINE HYDROCHLORIDE IN DEXTROSE 12 MG: 7.5 INJECTION, SOLUTION SUBARACHNOID at 16:33

## 2024-08-26 RX ADMIN — WATER 2000 MG: 1 INJECTION INTRAMUSCULAR; INTRAVENOUS; SUBCUTANEOUS at 16:15

## 2024-08-26 RX ADMIN — ACETAMINOPHEN 975 MG: 325 TABLET ORAL at 15:32

## 2024-08-26 RX ADMIN — Medication 100 MCG: at 16:35

## 2024-08-26 RX ADMIN — GLYCOPYRROLATE 0.2 MG: 0.2 INJECTION, SOLUTION INTRAMUSCULAR; INTRAVENOUS at 16:38

## 2024-08-26 RX ADMIN — FENTANYL CITRATE 15 MCG: 50 INJECTION, SOLUTION INTRAMUSCULAR; INTRAVENOUS at 16:30

## 2024-08-26 RX ADMIN — KETOROLAC TROMETHAMINE 30 MG: 30 INJECTION, SOLUTION INTRAMUSCULAR; INTRAVENOUS at 17:18

## 2024-08-26 RX ADMIN — FAMOTIDINE 20 MG: 10 INJECTION, SOLUTION INTRAVENOUS at 16:11

## 2024-08-26 RX ADMIN — DEXAMETHASONE SODIUM PHOSPHATE 8 MG: 4 INJECTION, SOLUTION INTRAMUSCULAR; INTRAVENOUS at 16:59

## 2024-08-26 RX ADMIN — SODIUM CHLORIDE, POTASSIUM CHLORIDE, SODIUM LACTATE AND CALCIUM CHLORIDE: 600; 310; 30; 20 INJECTION, SOLUTION INTRAVENOUS at 13:11

## 2024-08-26 RX ADMIN — ONDANSETRON 4 MG: 2 INJECTION INTRAMUSCULAR; INTRAVENOUS at 16:09

## 2024-08-26 RX ADMIN — MORPHINE SULFATE 0.2 MG: 0.5 INJECTION, SOLUTION EPIDURAL; INTRATHECAL; INTRAVENOUS at 16:30

## 2024-08-26 RX ADMIN — SODIUM CHLORIDE, POTASSIUM CHLORIDE, SODIUM LACTATE AND CALCIUM CHLORIDE: 600; 310; 30; 20 INJECTION, SOLUTION INTRAVENOUS at 17:53

## 2024-08-26 RX ADMIN — Medication 909 ML/HR: at 16:58

## 2024-08-26 RX ADMIN — SODIUM CITRATE AND CITRIC ACID MONOHYDRATE 30 ML: 500; 334 SOLUTION ORAL at 16:11

## 2024-08-26 RX ADMIN — ONDANSETRON 4 MG: 2 INJECTION INTRAMUSCULAR; INTRAVENOUS at 21:44

## 2024-08-26 ASSESSMENT — PAIN SCALES - GENERAL
PAINLEVEL_OUTOF10: 0
PAINLEVEL_OUTOF10: 2
PAINLEVEL_OUTOF10: 4

## 2024-08-26 ASSESSMENT — PAIN DESCRIPTION - FREQUENCY
FREQUENCY: INTERMITTENT
FREQUENCY: INTERMITTENT

## 2024-08-26 ASSESSMENT — PAIN DESCRIPTION - LOCATION
LOCATION: ABDOMEN
LOCATION: ABDOMEN

## 2024-08-26 ASSESSMENT — PAIN - FUNCTIONAL ASSESSMENT
PAIN_FUNCTIONAL_ASSESSMENT: ACTIVITIES ARE NOT PREVENTED
PAIN_FUNCTIONAL_ASSESSMENT: ACTIVITIES ARE NOT PREVENTED

## 2024-08-26 ASSESSMENT — PAIN DESCRIPTION - PAIN TYPE
TYPE: ACUTE PAIN
TYPE: ACUTE PAIN

## 2024-08-26 ASSESSMENT — PAIN DESCRIPTION - DESCRIPTORS
DESCRIPTORS: CRAMPING
DESCRIPTORS: CRAMPING

## 2024-08-26 ASSESSMENT — PAIN DESCRIPTION - RADICULAR PAIN: RADICULAR_PAIN: ABSENT

## 2024-08-26 ASSESSMENT — PAIN DESCRIPTION - ONSET: ONSET: GRADUAL

## 2024-08-26 ASSESSMENT — PAIN DESCRIPTION - ORIENTATION
ORIENTATION: LOWER
ORIENTATION: LOWER;MID

## 2024-08-26 NOTE — CONSULTS
Session ID: [id number]  Language: Bermudian Creole   ID: #[id number]   Name: Romi    Original interpreting session disconnected. Reconnected to ask pt about PPH symptoms.

## 2024-08-26 NOTE — ANESTHESIA PROCEDURE NOTES
Spinal Block    Patient location during procedure: OB  Reason for block: primary anesthetic  Staffing  Performed: resident/CRNA   Anesthesiologist: James Tello MD  Resident/CRNA: Araceli Madrigal APRN - CRNA  Performed by: Araceli Madrigal APRN - CRNA  Authorized by: James Tello MD    Spinal Block  Patient position: sitting  Prep: Betadine  Patient monitoring: cardiac monitor, frequent blood pressure checks and continuous pulse ox  Approach: midline  Location: L3/L4  Provider prep: mask and sterile gloves  Local infiltration: lidocaine  Needle  Needle type: Pencan   Needle gauge: 25 G  Needle length: 3.5 in  Assessment  Sensory level: T4  Swirl obtained: Yes  CSF: clear  Attempts: 1  Hemodynamics: stable  Preanesthetic Checklist  Completed: patient identified, IV checked, site marked, risks and benefits discussed, surgical/procedural consents, equipment checked, pre-op evaluation, timeout performed, anesthesia consent given, oxygen available, monitors applied/VS acknowledged, fire risk safety assessment completed and verbalized and blood product R/B/A discussed and consented

## 2024-08-26 NOTE — H&P
Department of Obstetrics and Gynecology   Obstetrics History and Physical        CHIEF COMPLAINT:   Chief Complaint   Patient presents with    Contractions         HISTORY OF PRESENT ILLNESS:      The patient is a 29 y.o.   at 40w1d.  OB History          4    Para   2    Term   2            AB   1    Living   2         SAB   1    IAB        Ectopic        Molar        Multiple        Live Births   2            Patient presents with a chief complaint as above and is being admitted for induction    Estimated Due Date: Estimated Date of Delivery: 24    PRENATAL CARE:    Complicated by: postdates pregnancy, suspected macrosomia, anemia, ASCUS, late prenatal care     PAST OB HISTORY  OB History          4    Para   2    Term   2            AB   1    Living   2         SAB   1    IAB        Ectopic        Molar        Multiple        Live Births   2                Past Medical History:    History reviewed. No pertinent past medical history.  Past Surgical History:        Procedure Laterality Date    DILATION AND CURETTAGE OF UTERUS  2021     Allergies:  Patient has no known allergies.  Social History:    Social History     Socioeconomic History    Marital status: Single     Spouse name: Not on file    Number of children: Not on file    Years of education: Not on file    Highest education level: Not on file   Occupational History    Not on file   Tobacco Use    Smoking status: Never    Smokeless tobacco: Never   Vaping Use    Vaping status: Never Used   Substance and Sexual Activity    Alcohol use: Not Currently    Drug use: Never    Sexual activity: Not Currently   Other Topics Concern    Not on file   Social History Narrative    Not on file     Social Determinants of Health     Financial Resource Strain: Not on file   Food Insecurity: Not on file   Transportation Needs: Not on file   Physical Activity: Not on file   Stress: Not on file   Social Connections: Not on file  CNM

## 2024-08-26 NOTE — ANESTHESIA PRE PROCEDURE
Department of Anesthesiology  Preprocedure Note       Name:  Jordy Kim   Age:  29 y.o.  :  1995                                          MRN:  [id number]         Date:  2024      Surgeon: * No surgeons listed *    Procedure: * No procedures listed *    Medications prior to admission:   Prior to Admission medications    Medication Sig Start Date End Date Taking? Authorizing Provider   Prenatal Vit-Fe Fumarate-FA (PRENATAL VITAMINS) 28-0.8 MG TABS Take 1 tablet by mouth daily 24  Yes Ilana Onofre DO   acetaminophen (TYLENOL) 500 MG tablet Take 1 tablet by mouth 4 times daily as needed for Pain 24  Yes Ilana Onofre DO       Current medications:    Current Facility-Administered Medications   Medication Dose Route Frequency Provider Last Rate Last Admin   • lactated ringers IV soln infusion   IntraVENous Continuous Carolin Lam APRN -  mL/hr at 24 1311 New Bag at 24 1311   • lactated ringers bolus 500 mL  500 mL IntraVENous PRN Carolin Lam APRN - CNM        Or   • lactated ringers bolus 1,000 mL  1,000 mL IntraVENous PRN Carolin Lam APRN - CNM       • sodium chloride flush 0.9 % injection 5-40 mL  5-40 mL IntraVENous 2 times per day Carolin Lam APRN - CNM       • sodium chloride flush 0.9 % injection 5-40 mL  5-40 mL IntraVENous PRN Carolin Lam APRN - CNM       • 0.9 % sodium chloride infusion  25 mL IntraVENous PRN Carolin Lam APRN - CNM       • oxytocin (PITOCIN) 30 units in 500 mL infusion  1-20 scarlett-units/min IntraVENous Continuous Carolin Lam APRN - CNM       • methylergonovine (METHERGINE) injection 200 mcg  200 mcg IntraMUSCular PRN Carolin Lam APRN - CNM       • carboprost (HEMABATE) injection 250 mcg  250 mcg IntraMUSCular PRN Carolin Lam APRN - CNM       • miSOPROStol (CYTOTEC) tablet 400 mcg  400 mcg Buccal PRN Carolin Lam APRN - CNM       • tranexamic acid-NaCl IVPB premix 1,000 mg

## 2024-08-26 NOTE — PROGRESS NOTES
Patient 29 year old  at 40w1d who was admitted for induction of labor. Patient is a RH patient and on 8/15 had US with EFW 99.3%, 9lb 5oz. She was counseled in the office about risk of shoulder dystocia and offered  section for suspected fetal macrosomia which she adamantly declined. Upon admission today, CNM again discussed risks of shoulder dystocia, brachial plexus injury, clavicular fracture due to suspected fetal macrosomia. Initially patient desired to try again for a  as she has had two previous SVDs, but did report the baby got 'stuck' during one of these. After further counseling patient desires to proceed with PLTCS due to suspected fetal macrosomia.    Patient was counseled on the risks, benefits, alternatives and indications of  delivery. Risks include, but are not limited to infection, bleeding possibly requiring transfusion (with associated risks of HIV, hepatitis, and other blood-borne illnesses) or hysterectomy, injury to surrounding organs, vasculature, and nerves, injury to fetus, post-operative thromboembolic events (including DVT and PE), risk of anesthesia, and risk of death.  Risk for future pregnancy and delivery concerns including risk of trial of labor after  section and repeat  section reviewed.  Patient verbalized understanding of these risks. All questions were answered to her satisfaction. Pt desires to proceed with  delivery.    Electronically signed by: Libra Roy DO 2024

## 2024-08-26 NOTE — PROGRESS NOTES
CNM had been advised that pt refused c/s mutliple times in the office and wanted to attempt vaginal delivery. CNM to room to clarify and ensure pt comfortable with the risks associated with  of macrosomic infant.    Discussed risks of shoulder dystocia including shoulder dystocia, brachial plexus injury, clavicular fracture and hypoxic injury given EFW of 4225g on 8/15. Advised pt that current EFW if likely close to 5kg. We discussed option of primary c/s to reduce these risks. In triage, pt had initially reported no issues with her first two vaginal deliveries but did state she needed episiotomies. In this conversation, pt reports she had a very difficult delivery with her son and he was stuck. I tried to clarify if he was stuck after his head was delivered and she reports, \"his head started coming out but then got stuck and then went back in\". I was unable to gather any more clarity regarding if this was a previous shoulder dystocia. After further counseling, pt reports she would actually prefer to proceed with primary c/s. Pt last had a sandwich at 11am. Will discuss with Dr. Roy.     KYLEIGH Anderson CNM

## 2024-08-26 NOTE — FLOWSHEET NOTE
Presents ambulatory to the BC c/o light vaginal bleeding.  Oriented to LT05 and instructed to obtain ccua and change into a gown.  Denies leaking of fluid. 1201 EFM and toco applied  +FM abdomen soft non tender.  Patient states was at the office today and after exam had some spotting.  States has been having mild cramping since last night.  BLANK Lam CNM notified of patients arrival and complaints.

## 2024-08-26 NOTE — ANESTHESIA PRE PROCEDURE
Department of Anesthesiology  Preprocedure Note       Name:  Jordy Kim   Age:  29 y.o.  :  1995                                          MRN:  [id number]         Date:  2024      Surgeon: Surgeon(s):  Libra Roy, Carolin Jara APRN - CNM    Procedure: Procedure(s):   SECTION    Medications prior to admission:   Prior to Admission medications    Medication Sig Start Date End Date Taking? Authorizing Provider   Prenatal Vit-Fe Fumarate-FA (PRENATAL VITAMINS) 28-0.8 MG TABS Take 1 tablet by mouth daily 24  Yes Ilana Onofre DO   acetaminophen (TYLENOL) 500 MG tablet Take 1 tablet by mouth 4 times daily as needed for Pain 24  Yes Ilana Onofre DO       Current medications:    Current Facility-Administered Medications   Medication Dose Route Frequency Provider Last Rate Last Admin   • lactated ringers IV soln infusion   IntraVENous Continuous Carolin Lam APRN -  mL/hr at 24 1311 New Bag at 24 1311   • lactated ringers bolus 500 mL  500 mL IntraVENous PRN Carolin Lam APRN - CNM        Or   • lactated ringers bolus 1,000 mL  1,000 mL IntraVENous PRN Carolin Lam APRN - CNM       • sodium chloride flush 0.9 % injection 5-40 mL  5-40 mL IntraVENous 2 times per day Carolin Lam APRN - CNM       • sodium chloride flush 0.9 % injection 5-40 mL  5-40 mL IntraVENous PRN Carolin Lam APRN - CNM       • 0.9 % sodium chloride infusion  25 mL IntraVENous PRN Carolin Lam APRN - CNM       • oxytocin (PITOCIN) 30 units in 500 mL infusion  1-20 scarlett-units/min IntraVENous Continuous Carolin Lam APRN - CNM       • methylergonovine (METHERGINE) injection 200 mcg  200 mcg IntraMUSCular PRN Carolin Lam APRN - CNM       • carboprost (HEMABATE) injection 250 mcg  250 mcg IntraMUSCular PRN Carolin Lam APRN - CNM       • miSOPROStol (CYTOTEC) tablet 400 mcg  400 mcg Buccal PRN Carolin Lam APRN - CNM       •

## 2024-08-26 NOTE — OP NOTE
Department of Obstetrics and Gynecology  Labor and Delivery  Operative Report  Name:  Jordy Kim   CSN: [id number]   Attending Provider: Libra Roy DO  Location:  [facility]/MR1-A   : 1995   Age: 29 y.o.     Section Operative Report    Date of surgery:   2024      Surgeon:   Libra Roy DO    First Assistant:   CHRISTIANO Anderson  The use of a first assistant was necessary for the proper positioning, prepping, and draping of the patient, as well as the safe and expeditious execution of the case and closure of skin and subcutaneous tissues.      Pre-op Diagnosis:   1. 40w1d IUP  2. Patient request for CS due to suspected fetal macrosomia  3. Anemia in pregnancy  4. Late prenatal care    Post-op Diagnosis:   1. 40w1d IUP  2. Patient request for CS due to suspected fetal macrosomia  3. Anemia in pregnancy  4. Late prenatal care  5. Uterine fibroid/cyst  6. Live male infant 9lb 3.7oz APGARs: 1min: 8, 5min: 9      Procedure:   PLTCS    Complications:   None    Anesthesia:   Spinal    QBL:  1066 cc    Specimen:  Cord blood, uterine fibroid/cyst    Indication:   The patient is a 29 y.o.  @ 40w1d presented for spotting in the office after cervical check. Patient post dates with suspected fetal macrosomia and admitted for induction of labor. Patient is a RH patient and on 8/15 had US with EFW 99.3%, 9lb 5oz. She was counseled in the office about risk of shoulder dystocia and offered  section for suspected fetal macrosomia which she adamantly declined. Upon admission today, CNM again discussed risks of shoulder dystocia, brachial plexus injury, clavicular fracture due to suspected fetal macrosomia. Initially patient desired to try again for a  as she has had two previous SVDs, but did report the baby got 'stuck' during one of these. After further counseling patient desires to proceed with PLTCS due to suspected fetal macrosomia.     Findings: Uterus, tubes and

## 2024-08-26 NOTE — PROGRESS NOTES
I have reviewed the patient's chart and H/P and first assisted on this patient's  delivery.     KYLEIGH Anderson CNM

## 2024-08-27 LAB
HCT VFR BLD CALC: 27.9 % (ref 37–47)
HEMOGLOBIN: 8.5 GM/DL (ref 12.5–16)
MCH RBC QN AUTO: 22.2 PG (ref 27–31)
MCHC RBC AUTO-ENTMCNC: 30.5 % (ref 32–36)
MCV RBC AUTO: 72.8 FL (ref 78–100)
PDW BLD-RTO: 15 % (ref 11.7–14.9)
PLATELET # BLD: 207 K/CU MM (ref 140–440)
PMV BLD AUTO: 13.2 FL (ref 7.5–11.1)
RBC # BLD: 3.83 M/CU MM (ref 4.2–5.4)
WBC # BLD: 13.4 K/CU MM (ref 4–10.5)

## 2024-08-27 PROCEDURE — 6360000002 HC RX W HCPCS: Performed by: STUDENT IN AN ORGANIZED HEALTH CARE EDUCATION/TRAINING PROGRAM

## 2024-08-27 PROCEDURE — APPNB30 APP NON BILLABLE TIME 0-30 MINS

## 2024-08-27 PROCEDURE — 2580000003 HC RX 258: Performed by: STUDENT IN AN ORGANIZED HEALTH CARE EDUCATION/TRAINING PROGRAM

## 2024-08-27 PROCEDURE — 1220000000 HC SEMI PRIVATE OB R&B

## 2024-08-27 PROCEDURE — 6370000000 HC RX 637 (ALT 250 FOR IP): Performed by: STUDENT IN AN ORGANIZED HEALTH CARE EDUCATION/TRAINING PROGRAM

## 2024-08-27 PROCEDURE — 85027 COMPLETE CBC AUTOMATED: CPT

## 2024-08-27 RX ADMIN — KETOROLAC TROMETHAMINE 30 MG: 30 INJECTION, SOLUTION INTRAMUSCULAR; INTRAVENOUS at 09:23

## 2024-08-27 RX ADMIN — DOCUSATE SODIUM 100 MG: 100 CAPSULE, LIQUID FILLED ORAL at 20:04

## 2024-08-27 RX ADMIN — ACETAMINOPHEN 1000 MG: 500 TABLET ORAL at 22:33

## 2024-08-27 RX ADMIN — DOCUSATE SODIUM 100 MG: 100 CAPSULE, LIQUID FILLED ORAL at 09:23

## 2024-08-27 RX ADMIN — FAMOTIDINE 20 MG: 20 TABLET ORAL at 20:04

## 2024-08-27 RX ADMIN — ACETAMINOPHEN 1000 MG: 500 TABLET ORAL at 14:03

## 2024-08-27 RX ADMIN — FAMOTIDINE 20 MG: 20 TABLET ORAL at 09:23

## 2024-08-27 RX ADMIN — SODIUM CHLORIDE, PRESERVATIVE FREE 10 ML: 5 INJECTION INTRAVENOUS at 18:16

## 2024-08-27 RX ADMIN — KETOROLAC TROMETHAMINE 30 MG: 30 INJECTION, SOLUTION INTRAMUSCULAR; INTRAVENOUS at 18:16

## 2024-08-27 ASSESSMENT — PAIN DESCRIPTION - PAIN TYPE
TYPE: SURGICAL PAIN
TYPE: SURGICAL PAIN

## 2024-08-27 ASSESSMENT — PAIN SCALES - GENERAL
PAINLEVEL_OUTOF10: 2
PAINLEVEL_OUTOF10: 0
PAINLEVEL_OUTOF10: 2
PAINLEVEL_OUTOF10: 3
PAINLEVEL_OUTOF10: 5
PAINLEVEL_OUTOF10: 0

## 2024-08-27 ASSESSMENT — PAIN DESCRIPTION - ONSET
ONSET: ON-GOING
ONSET: ON-GOING

## 2024-08-27 ASSESSMENT — PAIN DESCRIPTION - LOCATION
LOCATION: ABDOMEN
LOCATION: ABDOMEN;INCISION
LOCATION: ABDOMEN;INCISION

## 2024-08-27 ASSESSMENT — PAIN DESCRIPTION - ORIENTATION
ORIENTATION: LOWER

## 2024-08-27 ASSESSMENT — PAIN DESCRIPTION - DESCRIPTORS
DESCRIPTORS: CRAMPING;SORE
DESCRIPTORS: CRAMPING;SORE
DESCRIPTORS: BURNING;SHARP

## 2024-08-27 ASSESSMENT — PAIN DESCRIPTION - FREQUENCY
FREQUENCY: INTERMITTENT
FREQUENCY: INTERMITTENT

## 2024-08-27 ASSESSMENT — PAIN - FUNCTIONAL ASSESSMENT
PAIN_FUNCTIONAL_ASSESSMENT: ACTIVITIES ARE NOT PREVENTED

## 2024-08-27 NOTE — PROGRESS NOTES
08/27/24 1513   Encounter Summary   Encounter Overview/Reason Volunteer Encounter   Service Provided For Patient   Referral/Consult From Volunteer   Support System Family members   Last Encounter  08/27/24  (Visit by Eucharist derek Castillo charted by odell Lyon)   Complexity of Encounter Low   Spiritual/Emotional needs   Type Spiritual Support   Rituals, Rites and Sacraments   Type [employer]ion

## 2024-08-27 NOTE — FLOWSHEET NOTE
TR to Dr Chaney on trending upward BP while in recovery. Advised that she had no Hx of elevated BP antepartum or during admission. No new orders received.

## 2024-08-27 NOTE — CARE COORDINATION
LSW received a consult for pt needing a car seat.  RN can give pt a car seat on day of discharge.  LSW placed area resources on pt's AVS in her spoken language.  CM available if needed.

## 2024-08-27 NOTE — PROGRESS NOTES
Mother transfer to [facility] via PP bed by RN. Baby also transferred to [facility] via crib pushed by FOB. Baby resting with eye closed in crib, baby is pink and shows no s/s of distress. Mother denies any needs at this time, call light in reach. Report given to MB nurse [place] RN at bedside.

## 2024-08-27 NOTE — ANESTHESIA POSTPROCEDURE EVALUATION
Department of Anesthesiology  Postprocedure Note    Patient: Jordy Kim  MRN: [id number]  YOB: 1995  Date of evaluation: 2024    Procedure Summary       Date: 24 Room / Location: [address]    Anesthesia Start: 1625 Anesthesia Stop: 1742    Procedures:        SECTION (Abdomen)      Labor Analgesia Diagnosis: Labor and delivery, indication for care    Surgeons: Libra Roy DO Responsible Provider: Jay Blackwell MD    Anesthesia Type: Spinal ASA Status: 2 - Emergent            Anesthesia Type: Spinal    Rachel Phase I: Rachel Score: 9    Rachel Phase II: Rachel Score: 10    Anesthesia Post Evaluation    Patient location during evaluation: PACU  Patient participation: complete - patient participated  Level of consciousness: awake  Airway patency: patent  Nausea & Vomiting: no nausea  Cardiovascular status: blood pressure returned to baseline  Respiratory status: acceptable  Hydration status: euvolemic  Multimodal analgesia pain management approach  Pain management: adequate    No notable events documented.

## 2024-08-27 NOTE — LACTATION NOTE
Entered room and mother is breast feeding infant. Assisted mother with helping mother awaken infant and latching on the second side. Mother states she did breast feed other children for 7 months.    Lanolin ointment given to mother. Instructed mother that only small amount is needed if she uses. Informed mother to apply small amount to nipple. Informed mother that she does not need to wipe off lanolin because it is safe for the infant. Informed mother that if nipples get sore she can use lanolin ointment, use her own breast milk and to let nipples air dry. Informed mother that these will help decrease soreness. Mother verbalizes understanding.     Breastfeeding feeding log sheets in [facility] given to mother. Reminded mother that infant should breast feed every 2-3 hours and to offer both breast with each feeding. Informed mother that infant should breast feed 10 minute or longer on each side. Encouraged mother to call for any breast feeding assistance or breast feeding concerns. Mother verbalizes understanding.    Electric breast pump prescription given.

## 2024-08-27 NOTE — PROGRESS NOTES
Department of Obstetrics and Gynecology  Labor and Delivery   Post Partum Progress Note      SUBJECTIVE:  Doing well with no complaints. Ambulating throughout room without dizziness or other s/s of anemia. Reports bleeding is decreasing and pain is well controlled with medication. Denies pain or discharge at incisional site. Has voided without difficulty. Has not had BM, + flatus. Eating and drinking well. Denies HA/visual changes/epigastric pain. Bottlefeeding is going well. Reports good social support. Denies emotional concerns at this time.     OBJECTIVE:      Vitals:  /65   Pulse 64   Temp 98 °F (36.7 °C) (Oral)   Resp 16   Ht 1.702 m (5' 7\")   Wt 77.1 kg (170 lb)   SpO2 100%   Breastfeeding Unknown   BMI 26.63 kg/m²   Lab Results   Component Value Date    WBC 13.4 (H) 08/27/2024    HGB 8.5 (L) 08/27/2024    HCT 27.9 (L) 08/27/2024    MCV 72.8 (L) 08/27/2024     08/27/2024       CONSTITUTIONAL: generally well-appearing.   ABDOMEN:  Soft, well contracted uterus. Fundus firm at u-1. C/s incision c/d/i. No erythema or discharge noted. BS present x 4 quadrants.   LOCHIA: Normal per pt  LUNGS: CTAB  HEART: RRR,   EXTREMITIES: No calf tenderness, erythema or swelling bilaterally       ASSESSMENT:      PPD # 1  S/p C/s- macrosomia  Bottlefeeding well  GBS- Negative  RH AB+    PLAN:     Will continue with PP POC   Encourage early ambulation and PO hydration   Up to shower with instruction on incision care  Will plan for discharge on PPD3.    Time Spent 15      KYLEIGH Farfan - EUGENIOM

## 2024-08-27 NOTE — DISCHARGE INSTRUCTIONS
Ioana bird [facility] Center[facility] Cal[facility]oun  2-1-1:   Alfonzofòchaparro brito  [phone] o 2-1-1  [email or web address]    Ondina Robert:  koupon katty lyndon, isabella jones, gadri sibvansyone, PRC  [phone]    [facility]:   lyndon, èd ak lwdavide, rad ak b  [phone] or [phone]    Krystle barth:   lyndon èd ak lwaye, rad ak b  [phone]    [facility] ak [address]:     [facility]  [address] [place] [address]  [phone]     hermes sofy robert yo  [address] [place] [address]  [phone]     [facility]:   [facility] ped[facility], klinik sante granmoosiel avila dapre revni ou  [address] [place], [place]   [phone] o [phone]    Pepitopauline goetz [facility]:  asistans katty [facility]: [phone]  [email or web address]: asistans katty [facility] Center: [facility] joya, [facility] Hospital[facility]ke avila selon revni ou  [address]   [place] [address]  [phone]    [facility] [place]:  Nitrisyon katty ozziee ak yaneth mcgregor selon revni  [address], [place] [id number] [phone]    Transp[facility]  [facility]:  ofri sèvis otobis [facility]. ADA ofri sèvis pòt an pòt katty moun ki gen andikap  [phone]    Make yon vwayaj:    [phone]    [facility]: transpò katty granmoun alannah yo  [phone]            Katty pran rosalva menjivar [facility] katty wè yon doktè, rele [phone] epi mande yon entèprèt katty pran yon otto [facility] lè l severoi avèk kòd [phone].     Katty aplike katty WIC. Rele [facility] [phone] epi yo pral jwenn yon entèseptè katty pepito w nan [facility].     Konsènan Raul katty Paran ak Tibebe [facility]  Ou heriberto whaley [facility] katty resevwa èd nan [facility] [place]. Nou apple devlin si ou bezwen kouchèt, fòmil, rad fanmi ki byen itilize, sofy chan. Nou

## 2024-08-28 PROCEDURE — 6370000000 HC RX 637 (ALT 250 FOR IP)

## 2024-08-28 PROCEDURE — 94761 N-INVAS EAR/PLS OXIMETRY MLT: CPT

## 2024-08-28 PROCEDURE — APPNB30 APP NON BILLABLE TIME 0-30 MINS

## 2024-08-28 PROCEDURE — 6370000000 HC RX 637 (ALT 250 FOR IP): Performed by: STUDENT IN AN ORGANIZED HEALTH CARE EDUCATION/TRAINING PROGRAM

## 2024-08-28 PROCEDURE — 1220000000 HC SEMI PRIVATE OB R&B

## 2024-08-28 RX ORDER — FERROUS SULFATE 325(65) MG
325 TABLET ORAL 2 TIMES DAILY WITH MEALS
Status: DISCONTINUED | OUTPATIENT
Start: 2024-08-28 | End: 2024-08-29 | Stop reason: HOSPADM

## 2024-08-28 RX ADMIN — IBUPROFEN 800 MG: 800 TABLET, FILM COATED ORAL at 10:22

## 2024-08-28 RX ADMIN — FERROUS SULFATE TAB 325 MG (65 MG ELEMENTAL FE) 325 MG: 325 (65 FE) TAB at 18:18

## 2024-08-28 RX ADMIN — ACETAMINOPHEN 1000 MG: 500 TABLET ORAL at 22:08

## 2024-08-28 RX ADMIN — ACETAMINOPHEN 1000 MG: 500 TABLET ORAL at 06:08

## 2024-08-28 RX ADMIN — IBUPROFEN 800 MG: 800 TABLET, FILM COATED ORAL at 18:19

## 2024-08-28 RX ADMIN — DOCUSATE SODIUM 100 MG: 100 CAPSULE, LIQUID FILLED ORAL at 20:51

## 2024-08-28 RX ADMIN — FAMOTIDINE 20 MG: 20 TABLET ORAL at 20:51

## 2024-08-28 RX ADMIN — IBUPROFEN 800 MG: 800 TABLET, FILM COATED ORAL at 02:26

## 2024-08-28 RX ADMIN — DOCUSATE SODIUM 100 MG: 100 CAPSULE, LIQUID FILLED ORAL at 10:22

## 2024-08-28 ASSESSMENT — PAIN SCALES - GENERAL
PAINLEVEL_OUTOF10: 2
PAINLEVEL_OUTOF10: 5
PAINLEVEL_OUTOF10: 3
PAINLEVEL_OUTOF10: 2

## 2024-08-28 ASSESSMENT — PAIN DESCRIPTION - ORIENTATION
ORIENTATION: LOWER

## 2024-08-28 ASSESSMENT — PAIN - FUNCTIONAL ASSESSMENT
PAIN_FUNCTIONAL_ASSESSMENT: ACTIVITIES ARE NOT PREVENTED

## 2024-08-28 ASSESSMENT — PAIN DESCRIPTION - LOCATION
LOCATION: ABDOMEN;INCISION
LOCATION: ABDOMEN
LOCATION: ABDOMEN
LOCATION: ABDOMEN;INCISION

## 2024-08-28 ASSESSMENT — PAIN DESCRIPTION - ONSET
ONSET: GRADUAL
ONSET: GRADUAL

## 2024-08-28 ASSESSMENT — PAIN DESCRIPTION - DESCRIPTORS
DESCRIPTORS: CRAMPING;SORE
DESCRIPTORS: CRAMPING
DESCRIPTORS: CRAMPING
DESCRIPTORS: CRAMPING;SORE

## 2024-08-28 ASSESSMENT — PAIN DESCRIPTION - FREQUENCY
FREQUENCY: INTERMITTENT
FREQUENCY: INTERMITTENT

## 2024-08-28 ASSESSMENT — PAIN DESCRIPTION - PAIN TYPE
TYPE: SURGICAL PAIN
TYPE: SURGICAL PAIN

## 2024-08-28 NOTE — CONSULTS
Session ID: [id number]  Language: Finnish William   ID: #[id number]   Name: Danny    Used  to explain circumcision care to the patient. Explained that pt would need to use Vaseline each time the diaper is changed. Pt expressed understanding. Pt had no further questions.   Also used  at this time to go over plan of care for the day.

## 2024-08-28 NOTE — PROGRESS NOTES
Department of Obstetrics and Gynecology  Labor and Delivery   Post Partum Progress Note      SUBJECTIVE:  Doing well with no complaints. Ambulating throughout room without dizziness or other s/s of anemia. Reports bleeding is decreasing and pain is well controlled with medication. Denies pain or discharge at incisional site. Has voided without difficulty. Has not had BM, + flatus. Eating and drinking well. Denies HA/visual changes/epigastric pain. Breastfeeding is going well. Reports good social support. Denies emotional concerns at this time.     OBJECTIVE:      Vitals:  /67   Pulse 65   Temp 97.8 °F (36.6 °C) (Oral)   Resp 16   Ht 1.702 m (5' 7\")   Wt 77.1 kg (170 lb)   SpO2 100%   Breastfeeding Unknown   BMI 26.63 kg/m²   Lab Results   Component Value Date    WBC 13.4 (H) 08/27/2024    HGB 8.5 (L) 08/27/2024    HCT 27.9 (L) 08/27/2024    MCV 72.8 (L) 08/27/2024     08/27/2024       CONSTITUTIONAL: generally well-appearing.   ABDOMEN:  Soft, well contracted uterus. Fundus firm at u-1. C/s incision c/d/i. No erythema or discharge noted. BS present x 4 quadrants.   LOCHIA: Normal per pt  LUNGS: CTAB  HEART: RRR,   EXTREMITIES: No calf tenderness, erythema or swelling bilaterally       ASSESSMENT:      PPD # 2  S/p C/s  Breastfeeding well  GBS Negative   RH AB+  Anemia- will start iron     PLAN:     Will continue with PP POC  Will plan for discharge on PPD3.      Time Spent 10      KYLEIGH Farfan - EDEL

## 2024-08-28 NOTE — LACTATION NOTE
Mother in bed and breast feeding infant. Reminded mother to breast feed at least every 3 hours, offer both breast with each feeding. Infant is latched on with deep latch. Encouraged mother to call for any breast feeding assistance.

## 2024-08-28 NOTE — PLAN OF CARE
Problem: Pain  Goal: Verbalizes/displays adequate comfort level or baseline comfort level  Outcome: Progressing     Problem: Skin/Tissue Integrity  Goal: Absence of new skin breakdown  Description: 1.  Monitor for areas of redness and/or skin breakdown  2.  Assess vascular access sites hourly  3.  Every 4-6 hours minimum:  Change oxygen saturation probe site  4.  Every 4-6 hours:  If on nasal continuous positive airway pressure, respiratory therapy assess nares and determine need for appliance change or resting period.  Outcome: Progressing     Problem: Alteration in the Breast  Goal: Optimize infant feeding at the breast  Description: INTERVENTIONS:  1. Breast and nipple assessment  2. Assess prior breast feeding history  3. Hand expression of breast milk  4. Mechanical pumping  5. Nipple Shield  6. Supplemental formula feeding (LIP order)  7. Supplemental feeding system/device  8. For cracked, bleeding and or sore nipples reassess latch, treat damaged nipple  Outcome: Progressing     Problem: Inadequate Latch, Suck, or Swallow  Goal: Demonstrate ability to latch and sustain latch, audible swallowing and satiety  Description: INTERVENTIONS:  1.  Assess oral anatomy, notify LIP for abnormal findings  2.  Hand expression  3.  Maximize feeding opportunity (skin to skin, behavioral state)  4.  Positioning techniques  5.  Discourage use of pacifier-artificial nipple  6.  Educate mother on feeding cues  Outcome: Progressing     Problem: Postpartum  Goal: Experiences normal postpartum course  Description:  Postpartum OB-Pregnancy care plan goal which identifies if the mother is experiencing a normal postpartum course  Outcome: Progressing  Goal: Appropriate maternal -  bonding  Description:  Postpartum OB-Pregnancy care plan goal which identifies if the mother and  are bonding appropriately  Outcome: Progressing  Goal: Establishment of infant feeding pattern  Description:  Postpartum OB-Pregnancy care plan

## 2024-08-28 NOTE — LACTATION NOTE
Language line used.  [facility] #[id number]. Mother sitting up on the couch and infant is crying. Assisted mother with getting infant to have a deep latch. Mother initially latched infant, but latch was shallow. Showed mother how to hold infant and to bring him close to her breast so that he can get a deep latch on and to hold infant securely and close to the breast to maintain the deep latch. Mother able to hold infant with pillow support. Infant not able to be breast fed on second side at this time, taken for circumcision. Encouraged mother to breast feed infant once infant is back in her room. Mother had given formula during the night. Encouraged mother to breast feed for all feedings to keep breast stimulated and also for any assistance while here. Mother verbalizes understanding.

## 2024-08-29 VITALS
TEMPERATURE: 98.3 F | BODY MASS INDEX: 26.68 KG/M2 | HEIGHT: 67 IN | SYSTOLIC BLOOD PRESSURE: 116 MMHG | WEIGHT: 170 LBS | DIASTOLIC BLOOD PRESSURE: 70 MMHG | OXYGEN SATURATION: 100 % | HEART RATE: 62 BPM | RESPIRATION RATE: 18 BRPM

## 2024-08-29 PROCEDURE — 6370000000 HC RX 637 (ALT 250 FOR IP): Performed by: STUDENT IN AN ORGANIZED HEALTH CARE EDUCATION/TRAINING PROGRAM

## 2024-08-29 PROCEDURE — APPNB30 APP NON BILLABLE TIME 0-30 MINS: Performed by: ADVANCED PRACTICE MIDWIFE

## 2024-08-29 PROCEDURE — 6370000000 HC RX 637 (ALT 250 FOR IP)

## 2024-08-29 RX ORDER — IBUPROFEN 800 MG/1
800 TABLET, FILM COATED ORAL EVERY 8 HOURS PRN
Qty: 120 TABLET | Refills: 0 | Status: SHIPPED | OUTPATIENT
Start: 2024-08-29

## 2024-08-29 RX ORDER — FERROUS SULFATE 325(65) MG
325 TABLET ORAL 2 TIMES DAILY WITH MEALS
Qty: 30 TABLET | Refills: 1 | Status: SHIPPED | OUTPATIENT
Start: 2024-08-29

## 2024-08-29 RX ORDER — PSEUDOEPHEDRINE HCL 30 MG
100 TABLET ORAL 2 TIMES DAILY PRN
Qty: 60 CAPSULE | Refills: 0 | Status: SHIPPED | OUTPATIENT
Start: 2024-08-29

## 2024-08-29 RX ORDER — OXYCODONE HYDROCHLORIDE 5 MG/1
5 TABLET ORAL EVERY 6 HOURS PRN
Qty: 15 TABLET | Refills: 0 | Status: SHIPPED | OUTPATIENT
Start: 2024-08-29 | End: 2024-09-03

## 2024-08-29 RX ADMIN — DOCUSATE SODIUM 100 MG: 100 CAPSULE, LIQUID FILLED ORAL at 07:49

## 2024-08-29 RX ADMIN — IBUPROFEN 800 MG: 800 TABLET, FILM COATED ORAL at 02:15

## 2024-08-29 RX ADMIN — FERROUS SULFATE TAB 325 MG (65 MG ELEMENTAL FE) 325 MG: 325 (65 FE) TAB at 07:49

## 2024-08-29 RX ADMIN — ACETAMINOPHEN 1000 MG: 500 TABLET ORAL at 07:49

## 2024-08-29 RX ADMIN — IBUPROFEN 800 MG: 800 TABLET, FILM COATED ORAL at 13:05

## 2024-08-29 RX ADMIN — FAMOTIDINE 20 MG: 20 TABLET ORAL at 07:49

## 2024-08-29 ASSESSMENT — PAIN DESCRIPTION - LOCATION
LOCATION: ABDOMEN
LOCATION: ABDOMEN

## 2024-08-29 ASSESSMENT — PAIN SCALES - GENERAL
PAINLEVEL_OUTOF10: 5
PAINLEVEL_OUTOF10: 4
PAINLEVEL_OUTOF10: 6
PAINLEVEL_OUTOF10: 0

## 2024-08-29 ASSESSMENT — PAIN DESCRIPTION - ORIENTATION
ORIENTATION: LOWER;MID
ORIENTATION: LOWER;MID

## 2024-08-29 ASSESSMENT — PAIN DESCRIPTION - ONSET
ONSET: GRADUAL
ONSET: GRADUAL

## 2024-08-29 ASSESSMENT — PAIN DESCRIPTION - FREQUENCY
FREQUENCY: INTERMITTENT
FREQUENCY: INTERMITTENT

## 2024-08-29 ASSESSMENT — PAIN - FUNCTIONAL ASSESSMENT
PAIN_FUNCTIONAL_ASSESSMENT: ACTIVITIES ARE NOT PREVENTED

## 2024-08-29 ASSESSMENT — PAIN DESCRIPTION - DESCRIPTORS
DESCRIPTORS: CRAMPING
DESCRIPTORS: CRAMPING

## 2024-08-29 ASSESSMENT — PAIN DESCRIPTION - PAIN TYPE
TYPE: SURGICAL PAIN
TYPE: SURGICAL PAIN

## 2024-08-29 NOTE — CONSULTS
Session ID: [id number]  Language: Mosotho Creole   ID: #[id number]   Name: Nathalie    Assessment and education.

## 2024-08-29 NOTE — PLAN OF CARE
Problem: Pain  Goal: Verbalizes/displays adequate comfort level or baseline comfort level  8/29/2024 0849 by Deon Nguyễn RN  Outcome: Completed  8/28/2024 2159 by Kiersten Aquino RN  Outcome: Progressing     Problem: Skin/Tissue Integrity  Goal: Absence of new skin breakdown  Description: 1.  Monitor for areas of redness and/or skin breakdown  2.  Assess vascular access sites hourly  3.  Every 4-6 hours minimum:  Change oxygen saturation probe site  4.  Every 4-6 hours:  If on nasal continuous positive airway pressure, respiratory therapy assess nares and determine need for appliance change or resting period.  8/29/2024 0849 by Deon Nguyễn RN  Outcome: Completed  8/28/2024 2159 by Kiersten Aquino RN  Outcome: Progressing     Problem: Alteration in the Breast  Goal: Optimize infant feeding at the breast  Description: INTERVENTIONS:  1. Breast and nipple assessment  2. Assess prior breast feeding history  3. Hand expression of breast milk  4. Mechanical pumping  5. Nipple Shield  6. Supplemental formula feeding (LIP order)  7. Supplemental feeding system/device  8. For cracked, bleeding and or sore nipples reassess latch, treat damaged nipple  8/29/2024 0849 by Deon Nguyễn RN  Outcome: Completed  8/28/2024 2159 by Kiersten Aquino RN  Outcome: Progressing     Problem: Inadequate Latch, Suck, or Swallow  Goal: Demonstrate ability to latch and sustain latch, audible swallowing and satiety  Description: INTERVENTIONS:  1.  Assess oral anatomy, notify LIP for abnormal findings  2.  Hand expression  3.  Maximize feeding opportunity (skin to skin, behavioral state)  4.  Positioning techniques  5.  Discourage use of pacifier-artificial nipple  6.  Educate mother on feeding cues  8/29/2024 0849 by Deon Nguyễn RN  Outcome: Completed  8/28/2024 2159 by Kiersten Aquino RN  Outcome: Progressing     Problem: Postpartum  Goal: Experiences normal postpartum course  Description:  Postpartum OB-Pregnancy care plan goal which  identifies if the mother is experiencing a normal postpartum course  2024 08 by Deon Nguyễn RN  Outcome: Completed  2024 by Kiersten Aquino RN  Outcome: Progressing  Goal: Appropriate maternal -  bonding  Description:  Postpartum OB-Pregnancy care plan goal which identifies if the mother and  are bonding appropriately  2024 08 by Deon Nguyễn RN  Outcome: Completed  2024 by Kiersten Aquino, RN  Outcome: Progressing  Goal: Establishment of infant feeding pattern  Description:  Postpartum OB-Pregnancy care plan goal which identifies if the mother is establishing a feeding pattern with their   2024 08 by Deon Nguyễn RN  Outcome: Completed  2024 by Kiersten Aquino, RN  Outcome: Progressing  Goal: Incisions, wounds, or drain sites healing without S/S of infection  2024 by Deon Nguyễn RN  Outcome: Completed  2024 by Kiersten Aquino, RN  Outcome: Progressing

## 2024-08-29 NOTE — PLAN OF CARE
Problem: Pain  Goal: Verbalizes/displays adequate comfort level or baseline comfort level  8/28/2024 2159 by Kiersten Aquino RN  Outcome: Progressing  8/28/2024 1831 by Ami Osuna, RN  Outcome: Progressing     Problem: Skin/Tissue Integrity  Goal: Absence of new skin breakdown  Description: 1.  Monitor for areas of redness and/or skin breakdown  2.  Assess vascular access sites hourly  3.  Every 4-6 hours minimum:  Change oxygen saturation probe site  4.  Every 4-6 hours:  If on nasal continuous positive airway pressure, respiratory therapy assess nares and determine need for appliance change or resting period.  8/28/2024 2159 by Kiersten Aquino, RN  Outcome: Progressing  8/28/2024 1831 by Ami Osuna, RN  Outcome: Progressing     Problem: Alteration in the Breast  Goal: Optimize infant feeding at the breast  Description: INTERVENTIONS:  1. Breast and nipple assessment  2. Assess prior breast feeding history  3. Hand expression of breast milk  4. Mechanical pumping  5. Nipple Shield  6. Supplemental formula feeding (LIP order)  7. Supplemental feeding system/device  8. For cracked, bleeding and or sore nipples reassess latch, treat damaged nipple  8/28/2024 2159 by Kiersten Aquino, RN  Outcome: Progressing  8/28/2024 1831 by Ami Osuna, RN  Outcome: Progressing     Problem: Inadequate Latch, Suck, or Swallow  Goal: Demonstrate ability to latch and sustain latch, audible swallowing and satiety  Description: INTERVENTIONS:  1.  Assess oral anatomy, notify LIP for abnormal findings  2.  Hand expression  3.  Maximize feeding opportunity (skin to skin, behavioral state)  4.  Positioning techniques  5.  Discourage use of pacifier-artificial nipple  6.  Educate mother on feeding cues  8/28/2024 2159 by Kiersten Aquino, RN  Outcome: Progressing  8/28/2024 1831 by Ami Osuna, RN  Outcome: Progressing     Problem: Postpartum  Goal: Experiences normal postpartum course  Description:  Postpartum  OB-Pregnancy care plan goal which identifies if the mother is experiencing a normal postpartum course  2024 by Kiersten Aquino RN  Outcome: Progressing  2024 by Ami Osuna, RN  Outcome: Progressing  Goal: Appropriate maternal -  bonding  Description:  Postpartum OB-Pregnancy care plan goal which identifies if the mother and  are bonding appropriately  2024 by Kiersten Aquino RN  Outcome: Progressing  2024 by Ami Osuna RN  Outcome: Progressing  Goal: Establishment of infant feeding pattern  Description:  Postpartum OB-Pregnancy care plan goal which identifies if the mother is establishing a feeding pattern with their   2024 by Kiersten Aquino RN  Outcome: Progressing  2024 by Ami Osuna, RN  Outcome: Progressing  Goal: Incisions, wounds, or drain sites healing without S/S of infection  2024 by Kiersten Aquino RN  Outcome: Progressing  2024 by Ami Osuna, RN  Outcome: Progressing

## 2024-08-29 NOTE — LACTATION NOTE
Language line used.  Miller Aviles #[id number]. Mother states infant is breast feeding frequently. Encouraged mother to breast feed infant on demand and that will help her duran milk to keep coming in. Mother verbalizes understanding. Reminded mother to breast feed at least every 3 hours or more often with feeding cues, offer both breasts with each feeding and to breast feed 10 minutes or longer on each breast and to burp infant after feedings. Mother verbalizes understanding. Encouraged mother to call for any breast feeding assistance as needed.

## 2024-08-29 NOTE — PROGRESS NOTES
ID bands checked. Infant's ID band removed and stapled to footprint sheet, the mother verified as correct, signed and witnessed by RN. Hugs tag removed. Mother of baby signed Safe Baby Crib Form verifying that she does have a safe crib for baby at home. Discharge instructions given and reviewed. Patient voiced understanding. Rx's reviewed and Electronically sent to [facility]. Written and verbal education provided about opiate side effects and possibility of addiction. Patient voiced understanding. Patient is ambulating well at discharge with pain #0. Pt's  is driving patient and baby home. Mother verbalizes understanding to follow-up with Pediatric Provider in 2-3 days, and OB Provider Dr. Roy in 1 week for incision check and in 6 weeks as instructed. Baby pink, harnessed into carseat at discharge by parents. Parents and baby escorted to hospital exit by nurse.

## 2024-08-29 NOTE — DISCHARGE SUMMARY
Obstetrical Discharge Form    Gestational Age:  39w2d    Antepartum complications: suspected macrosomia, possible hx of shoulder dystocia, late prenatal care, anemia     Date of Delivery:   24      Type of Delivery:    for suspected macrosomia     Delivered By:    Dr. Roy              Baby:       Information for the patient's :  Erick Kim [[id number]]      Anesthesia:    Spinal    Intrapartum complications: None    Feeding method:   breast    Postpartum complications: anemia    Discharge Date:  24     Condition of discharge:  good    Plan:   Follow up    1 week for incision check    KYLEIGH Anderson CNM

## 2024-08-29 NOTE — PROGRESS NOTES
Department of Obstetrics and Gynecology  Labor and Delivery   Post Partum Progress Note      SUBJECTIVE:  Doing well with no complaints. Ambulating throughout room without dizziness or other s/s of anemia. Reports bleeding is decreasing and pain is well controlled with medication. Denies pain or discharge at incisional site. Has voided without difficulty. Has had BM, + flatus. Eating and drinking well. Denies HA/visual changes/epigastric pain. Breastfeeding is going well. Reports good social support. Denies emotional concerns at this time.     OBJECTIVE:      Vitals:  /65   Pulse 57   Temp 98 °F (36.7 °C) (Oral)   Resp 18   Ht 1.702 m (5' 7\")   Wt 77.1 kg (170 lb)   SpO2 100%   Breastfeeding Unknown   BMI 26.63 kg/m²   Lab Results   Component Value Date    WBC 13.4 (H) 08/27/2024    HGB 8.5 (L) 08/27/2024    HCT 27.9 (L) 08/27/2024    MCV 72.8 (L) 08/27/2024     08/27/2024       CONSTITUTIONAL: generally well-appearing.   ABDOMEN:  Soft, well contracted uterus. Fundus firm at u-1. C/s incision c/d/i. No erythema or discharge noted. BS present x 4 quadrants.   LOCHIA: Normal per pt  LUNGS: CTAB  HEART: RRR,   EXTREMITIES: No calf tenderness, erythema or swelling bilaterally       ASSESSMENT:      POD # 3  S/p C/s for suspected macrosomia and possible hx of shoulder dystocia  Breastfeeding well  GBS neg  RH +  Anemia (hgb 8.5)    PLAN:     Will plan for discharge today.  Discharge teaching completed including counseling on warning signs (heavy vaginal bleeding, s/s of preeclampsia, fever >100.4, ACHES, s/s of PPD, s/s of infection at incision site).  Rx for ferrous suflate, colace, ibuprofen and oxycodone.   Pt to schedule f/u incision check at 1 week and pp visit at 6-8 weeks in the office.     Time Spent: 15 min    KYLEIGH Anderson CNM

## 2025-08-14 ENCOUNTER — HOSPITAL ENCOUNTER (EMERGENCY)
Age: 30
Discharge: HOME | End: 2025-08-14
Attending: EMERGENCY MEDICINE
Payer: COMMERCIAL

## 2025-08-14 VITALS
RESPIRATION RATE: 18 BRPM | TEMPERATURE: 98.2 F | OXYGEN SATURATION: 98 % | DIASTOLIC BLOOD PRESSURE: 73 MMHG | HEART RATE: 77 BPM | SYSTOLIC BLOOD PRESSURE: 116 MMHG

## 2025-08-14 DIAGNOSIS — R21 RASH AND OTHER NONSPECIFIC SKIN ERUPTION: Primary | ICD-10-CM

## 2025-08-14 PROCEDURE — 99283 EMERGENCY DEPT VISIT LOW MDM: CPT

## 2025-08-14 RX ORDER — PERMETHRIN 50 MG/G
CREAM TOPICAL
Qty: 60 G | Refills: 1 | Status: SHIPPED | OUTPATIENT
Start: 2025-08-14

## (undated) DEVICE — SPONGE GZ W4XL8IN COT WVN 12 PLY

## (undated) DEVICE — SUTURE VICRYL ABSRB BRAID COAT UD CTX 3-0 36IN  J980H

## (undated) DEVICE — MATTRESS MAXI AIR TRNSF SGL PT USE DCS 37 45 48 52 75

## (undated) DEVICE — ELECTRODE ES AD CRDLSS PT RET REM POLYHESIVE

## (undated) DEVICE — GOWN,ECLIPSE,POLYRNF,BRTHSLV,L,30/CS: Brand: MEDLINE

## (undated) DEVICE — STRIP,CLOSURE,WOUND,MEDI-STRIP,1/2X4: Brand: MEDLINE

## (undated) DEVICE — SUTURE VICRYL SZ 1 L36IN ABSRB VLT L40MM CT 1/2 CIR J359H

## (undated) DEVICE — TOTAL TRAY, DB, 100% SILI FOLEY, 16FR 10: Brand: MEDLINE

## (undated) DEVICE — CLEANER,CAUTERY TIP,2X2",STERILE: Brand: MEDLINE

## (undated) DEVICE — STRIP SKIN CLSR W1XL5IN NYLON REINF CURAD STERIL

## (undated) DEVICE — PACK PROCEDURE SURG C SECT

## (undated) DEVICE — SUTURE MONOCRYL SZ 3-0 L27IN ABSRB UD L60MM KS STR REV CUT Y523H

## (undated) DEVICE — BLOOD TRANSFER DEVICE: Brand: MEDLINE

## (undated) DEVICE — BABY BLANKET KIT: Brand: MEDLINE INDUSTRIES, INC.

## (undated) DEVICE — DRAPE SHEET ULTRAGARD: Brand: MEDLINE

## (undated) DEVICE — DRESSING TRNSPAR W8XL12IN FLM SURESITE 123

## (undated) DEVICE — 3M™ STERI-STRIP™ COMPOUND BENZOIN TINCTURE 40 BAGS/CARTON 4 CARTONS/CASE C1544: Brand: 3M™ STERI-STRIP™

## (undated) DEVICE — 4-PORT MANIFOLD: Brand: NEPTUNE 2

## (undated) DEVICE — APPLICATOR MEDICATED 26 CC SOLUTION HI LT ORNG CHLORAPREP

## (undated) DEVICE — TOWEL,OR,DSP,ST,BLUE,STD,6/PK,12PK/CS: Brand: MEDLINE

## (undated) DEVICE — WOUND RETRACTOR AND PROTECTOR: Brand: ALEXIS WOUND PROTECTOR-RETRACTOR

## (undated) DEVICE — GARMENT,MEDLINE,DVT,INT,CALF,MED, GEN2: Brand: MEDLINE